# Patient Record
Sex: FEMALE | Race: BLACK OR AFRICAN AMERICAN | NOT HISPANIC OR LATINO | Employment: OTHER | URBAN - METROPOLITAN AREA
[De-identification: names, ages, dates, MRNs, and addresses within clinical notes are randomized per-mention and may not be internally consistent; named-entity substitution may affect disease eponyms.]

---

## 2023-04-17 LAB
BASOPHILS (10*3/UL) IN BLOOD BY AUTOMATED COUNT: 0.03 X10E9/L (ref 0–0.1)
BASOPHILS/100 LEUKOCYTES IN BLOOD BY AUTOMATED COUNT: 0.5 % (ref 0–2)
CHOLESTEROL (MG/DL) IN SER/PLAS: 118 MG/DL (ref 0–199)
CHOLESTEROL IN HDL (MG/DL) IN SER/PLAS: 39.4 MG/DL
CHOLESTEROL/HDL RATIO: 3
EOSINOPHILS (10*3/UL) IN BLOOD BY AUTOMATED COUNT: 0.08 X10E9/L (ref 0–0.4)
EOSINOPHILS/100 LEUKOCYTES IN BLOOD BY AUTOMATED COUNT: 1.3 % (ref 0–6)
ERYTHROCYTE DISTRIBUTION WIDTH (RATIO) BY AUTOMATED COUNT: 15.3 % (ref 11.5–14.5)
ERYTHROCYTE MEAN CORPUSCULAR HEMOGLOBIN CONCENTRATION (G/DL) BY AUTOMATED: 31.4 G/DL (ref 32–36)
ERYTHROCYTE MEAN CORPUSCULAR VOLUME (FL) BY AUTOMATED COUNT: 92 FL (ref 80–100)
ERYTHROCYTES (10*6/UL) IN BLOOD BY AUTOMATED COUNT: 3.26 X10E12/L (ref 4–5.2)
HEMATOCRIT (%) IN BLOOD BY AUTOMATED COUNT: 29.9 % (ref 36–46)
HEMOGLOBIN (G/DL) IN BLOOD: 9.4 G/DL (ref 12–16)
IMMATURE GRANULOCYTES/100 LEUKOCYTES IN BLOOD BY AUTOMATED COUNT: 0.5 % (ref 0–0.9)
LDL: 61 MG/DL (ref 0–99)
LEUKOCYTES (10*3/UL) IN BLOOD BY AUTOMATED COUNT: 6.3 X10E9/L (ref 4.4–11.3)
LYMPHOCYTES (10*3/UL) IN BLOOD BY AUTOMATED COUNT: 1.79 X10E9/L (ref 0.8–3)
LYMPHOCYTES/100 LEUKOCYTES IN BLOOD BY AUTOMATED COUNT: 28.3 % (ref 13–44)
MONOCYTES (10*3/UL) IN BLOOD BY AUTOMATED COUNT: 0.7 X10E9/L (ref 0.05–0.8)
MONOCYTES/100 LEUKOCYTES IN BLOOD BY AUTOMATED COUNT: 11.1 % (ref 2–10)
NATRIURETIC PEPTIDE B (PG/ML) IN SER/PLAS: 104 PG/ML (ref 0–99)
NEUTROPHILS (10*3/UL) IN BLOOD BY AUTOMATED COUNT: 3.7 X10E9/L (ref 1.6–5.5)
NEUTROPHILS/100 LEUKOCYTES IN BLOOD BY AUTOMATED COUNT: 58.3 % (ref 40–80)
PLATELETS (10*3/UL) IN BLOOD AUTOMATED COUNT: 240 X10E9/L (ref 150–450)
THYROTROPIN (MIU/L) IN SER/PLAS BY DETECTION LIMIT <= 0.05 MIU/L: 2.01 MIU/L (ref 0.44–3.98)
TRIGLYCERIDE (MG/DL) IN SER/PLAS: 90 MG/DL (ref 0–149)
VLDL: 18 MG/DL (ref 0–40)

## 2023-04-18 LAB
COBALAMIN (VITAMIN B12) (PG/ML) IN SER/PLAS: 171 PG/ML (ref 211–911)
ESTIMATED AVERAGE GLUCOSE FOR HBA1C: 140 MG/DL
FOLATE (NG/ML) IN SER/PLAS: 8.5 NG/ML
HEMOGLOBIN A1C/HEMOGLOBIN TOTAL IN BLOOD: 6.5 %

## 2023-10-03 PROBLEM — I10 BENIGN ESSENTIAL HYPERTENSION: Status: ACTIVE | Noted: 2023-10-03

## 2023-10-03 PROBLEM — G89.29 CHRONIC ABDOMINAL PAIN: Status: ACTIVE | Noted: 2023-10-03

## 2023-10-03 PROBLEM — J18.9 PNEUMONIA: Status: ACTIVE | Noted: 2023-10-03

## 2023-10-03 PROBLEM — M85.80 OSTEOPENIA: Status: ACTIVE | Noted: 2023-10-03

## 2023-10-03 PROBLEM — K22.70 BARRETT'S ESOPHAGUS: Status: ACTIVE | Noted: 2023-10-03

## 2023-10-03 PROBLEM — R79.82 CRP ELEVATED: Status: ACTIVE | Noted: 2023-10-03

## 2023-10-03 PROBLEM — R19.5 ABNORMAL STOOLS: Status: ACTIVE | Noted: 2023-10-03

## 2023-10-03 PROBLEM — M17.10 OSTEOARTHRITIS, LOCALIZED, KNEE: Status: ACTIVE | Noted: 2023-10-03

## 2023-10-03 PROBLEM — F32.A ANXIETY AND DEPRESSION: Status: ACTIVE | Noted: 2023-10-03

## 2023-10-03 PROBLEM — G30.9: Status: ACTIVE | Noted: 2023-10-03

## 2023-10-03 PROBLEM — H53.459 LOSS OF PERIPHERAL VISUAL FIELD: Status: ACTIVE | Noted: 2023-10-03

## 2023-10-03 PROBLEM — H26.9 CATARACT: Status: ACTIVE | Noted: 2023-10-03

## 2023-10-03 PROBLEM — I50.32 CHRONIC DIASTOLIC HEART FAILURE (MULTI): Status: ACTIVE | Noted: 2023-10-03

## 2023-10-03 PROBLEM — C67.9 MALIGNANT NEOPLASM OF URINARY BLADDER (MULTI): Status: ACTIVE | Noted: 2023-10-03

## 2023-10-03 PROBLEM — F41.9 ANXIETY AND DEPRESSION: Status: ACTIVE | Noted: 2023-10-03

## 2023-10-03 PROBLEM — H47.20 OPTIC ATROPHY, RIGHT EYE: Status: ACTIVE | Noted: 2023-10-03

## 2023-10-03 PROBLEM — R10.9 CHRONIC ABDOMINAL PAIN: Status: ACTIVE | Noted: 2023-10-03

## 2023-10-03 PROBLEM — F02.80: Status: ACTIVE | Noted: 2023-10-03

## 2023-10-03 PROBLEM — H47.019: Status: ACTIVE | Noted: 2023-10-03

## 2023-10-03 PROBLEM — N32.89 BLADDER MASS: Status: ACTIVE | Noted: 2023-10-03

## 2023-10-03 PROBLEM — K57.92 DIVERTICULITIS: Status: ACTIVE | Noted: 2023-10-03

## 2023-10-03 PROBLEM — E11.9 TYPE 2 DIABETES MELLITUS (MULTI): Status: ACTIVE | Noted: 2023-10-03

## 2023-10-03 PROBLEM — E53.8 VITAMIN B12 DEFICIENCY: Status: ACTIVE | Noted: 2023-10-03

## 2023-10-03 PROBLEM — D64.9 ANEMIA: Status: ACTIVE | Noted: 2023-10-03

## 2023-10-03 PROBLEM — H52.7 REFRACTION ERROR: Status: ACTIVE | Noted: 2023-10-03

## 2023-10-03 PROBLEM — K59.09 CHRONIC CONSTIPATION: Status: ACTIVE | Noted: 2023-10-03

## 2023-10-03 PROBLEM — E55.9 VITAMIN D DEFICIENCY: Status: ACTIVE | Noted: 2023-10-03

## 2023-10-03 PROBLEM — R30.0 DYSURIA: Status: ACTIVE | Noted: 2023-10-03

## 2023-10-03 RX ORDER — LISINOPRIL 40 MG/1
40 TABLET ORAL
COMMUNITY
Start: 2015-10-16 | End: 2024-01-12 | Stop reason: SDUPTHER

## 2023-10-03 RX ORDER — CHLORTHALIDONE 25 MG/1
25 TABLET ORAL DAILY
COMMUNITY
End: 2024-01-08 | Stop reason: ALTCHOICE

## 2023-10-03 RX ORDER — CHLORDIAZEPOXIDE AND AMITRIPTYLINE HYDROCHLORIDE 10; 27.98 MG/1; MG/1
1 TABLET, FILM COATED ORAL NIGHTLY
COMMUNITY
Start: 2014-10-07 | End: 2023-12-11

## 2023-10-03 RX ORDER — PANTOPRAZOLE SODIUM 40 MG/1
1 TABLET, DELAYED RELEASE ORAL DAILY
COMMUNITY
End: 2023-12-11 | Stop reason: SDUPTHER

## 2023-10-03 RX ORDER — GLYBURIDE 2.5 MG/1
2.5 TABLET ORAL
COMMUNITY

## 2023-10-03 RX ORDER — OXYCODONE AND ACETAMINOPHEN 5; 325 MG/1; MG/1
1-2 TABLET ORAL EVERY 8 HOURS PRN
COMMUNITY
Start: 2023-09-01 | End: 2023-12-11 | Stop reason: ALTCHOICE

## 2023-10-03 RX ORDER — LACTULOSE 10 G/10G
10 SOLUTION ORAL 3 TIMES DAILY PRN
COMMUNITY

## 2023-10-03 RX ORDER — ONDANSETRON 4 MG/1
TABLET, FILM COATED ORAL
COMMUNITY
Start: 2015-12-07

## 2023-10-03 RX ORDER — FERROUS SULFATE 325(65) MG
325 TABLET ORAL
COMMUNITY
Start: 2023-08-17

## 2023-10-03 RX ORDER — ALBUTEROL SULFATE 0.83 MG/ML
2.5 SOLUTION RESPIRATORY (INHALATION) EVERY 6 HOURS PRN
COMMUNITY
End: 2024-02-20 | Stop reason: SDUPTHER

## 2023-10-03 RX ORDER — DULOXETIN HYDROCHLORIDE 20 MG/1
20 CAPSULE, DELAYED RELEASE ORAL
COMMUNITY
Start: 2023-04-28 | End: 2024-01-12 | Stop reason: SDUPTHER

## 2023-10-03 RX ORDER — MEMANTINE HYDROCHLORIDE 10 MG/1
10 TABLET ORAL
COMMUNITY
Start: 2015-04-09

## 2023-10-03 RX ORDER — BUSPIRONE HYDROCHLORIDE 10 MG/1
10 TABLET ORAL EVERY EVENING
COMMUNITY
End: 2024-01-08 | Stop reason: SDUPTHER

## 2023-10-03 RX ORDER — METFORMIN HYDROCHLORIDE 1000 MG/1
1000 TABLET ORAL
COMMUNITY

## 2023-10-03 RX ORDER — TRAZODONE HYDROCHLORIDE 50 MG/1
50 TABLET ORAL DAILY PRN
COMMUNITY
Start: 2023-09-01 | End: 2024-02-20 | Stop reason: ALTCHOICE

## 2023-10-03 RX ORDER — NALOXONE HYDROCHLORIDE 4 MG/.1ML
SPRAY NASAL
COMMUNITY
Start: 2023-09-01 | End: 2023-12-11 | Stop reason: ALTCHOICE

## 2023-10-03 RX ORDER — METOPROLOL TARTRATE 25 MG/1
TABLET, FILM COATED ORAL
COMMUNITY

## 2023-10-03 RX ORDER — FUROSEMIDE 40 MG/1
40 TABLET ORAL
COMMUNITY
Start: 2014-07-03

## 2023-10-03 RX ORDER — LANOLIN ALCOHOL/MO/W.PET/CERES
CREAM (GRAM) TOPICAL
COMMUNITY
Start: 2015-03-26

## 2023-10-03 RX ORDER — DEXTROMETHORPHAN HYDROBROMIDE, GUAIFENESIN 5; 100 MG/5ML; MG/5ML
650 LIQUID ORAL EVERY 8 HOURS PRN
COMMUNITY

## 2023-10-03 RX ORDER — CARVEDILOL 25 MG/1
25 TABLET ORAL
COMMUNITY
End: 2024-01-08 | Stop reason: SDUPTHER

## 2023-10-03 RX ORDER — DONEPEZIL HYDROCHLORIDE 10 MG/1
10 TABLET, FILM COATED ORAL
COMMUNITY
Start: 2015-03-04 | End: 2024-01-12 | Stop reason: SDUPTHER

## 2023-10-03 RX ORDER — PIOGLITAZONEHYDROCHLORIDE 30 MG/1
30 TABLET ORAL
COMMUNITY
Start: 2015-05-14

## 2023-10-03 RX ORDER — TRAMADOL HYDROCHLORIDE 50 MG/1
50 TABLET ORAL
COMMUNITY
End: 2023-12-11 | Stop reason: ALTCHOICE

## 2023-10-03 RX ORDER — HYDROCHLOROTHIAZIDE 25 MG/1
25 TABLET ORAL
COMMUNITY
Start: 2014-08-19 | End: 2024-01-08 | Stop reason: SDUPTHER

## 2023-10-04 ENCOUNTER — APPOINTMENT (OUTPATIENT)
Dept: GERIATRIC MEDICINE | Facility: CLINIC | Age: 88
End: 2023-10-04
Payer: MEDICARE

## 2023-11-15 ENCOUNTER — OFFICE VISIT (OUTPATIENT)
Dept: GERIATRIC MEDICINE | Facility: CLINIC | Age: 88
End: 2023-11-15
Payer: MEDICARE

## 2023-11-15 DIAGNOSIS — F03.90 MAJOR NEUROCOGNITIVE DISORDER (MULTI): ICD-10-CM

## 2023-11-15 DIAGNOSIS — S72.142S CLOSED DISPLACED INTERTROCHANTERIC FRACTURE OF LEFT FEMUR, SEQUELA: Primary | ICD-10-CM

## 2023-11-15 DIAGNOSIS — D64.9 ANEMIA, UNSPECIFIED TYPE: ICD-10-CM

## 2023-11-15 DIAGNOSIS — L89.302 PRESSURE INJURY OF BUTTOCK, STAGE 2, UNSPECIFIED LATERALITY (MULTI): ICD-10-CM

## 2023-11-15 DIAGNOSIS — E11.69 TYPE 2 DIABETES MELLITUS WITH OTHER SPECIFIED COMPLICATION, WITHOUT LONG-TERM CURRENT USE OF INSULIN (MULTI): ICD-10-CM

## 2023-11-15 DIAGNOSIS — R91.1 LUNG NODULE: ICD-10-CM

## 2023-11-15 PROCEDURE — 1160F RVW MEDS BY RX/DR IN RCRD: CPT | Performed by: NURSE PRACTITIONER

## 2023-11-15 PROCEDURE — 1158F ADVNC CARE PLAN TLK DOCD: CPT | Performed by: NURSE PRACTITIONER

## 2023-11-15 PROCEDURE — 1159F MED LIST DOCD IN RCRD: CPT | Performed by: NURSE PRACTITIONER

## 2023-11-15 PROCEDURE — 3074F SYST BP LT 130 MM HG: CPT | Performed by: NURSE PRACTITIONER

## 2023-11-15 PROCEDURE — 3078F DIAST BP <80 MM HG: CPT | Performed by: NURSE PRACTITIONER

## 2023-11-15 PROCEDURE — 1036F TOBACCO NON-USER: CPT | Performed by: NURSE PRACTITIONER

## 2023-11-15 PROCEDURE — 1126F AMNT PAIN NOTED NONE PRSNT: CPT | Performed by: NURSE PRACTITIONER

## 2023-11-15 PROCEDURE — 99349 HOME/RES VST EST MOD MDM 40: CPT | Performed by: NURSE PRACTITIONER

## 2023-11-15 ASSESSMENT — PAIN SCALES - GENERAL: PAINLEVEL: 0-NO PAIN

## 2023-11-19 VITALS — RESPIRATION RATE: 18 BRPM | DIASTOLIC BLOOD PRESSURE: 60 MMHG | SYSTOLIC BLOOD PRESSURE: 120 MMHG | HEART RATE: 60 BPM

## 2023-11-19 ASSESSMENT — ENCOUNTER SYMPTOMS
LOSS OF SENSATION IN FEET: 0
OCCASIONAL FEELINGS OF UNSTEADINESS: 1
DEPRESSION: 0

## 2023-11-20 ENCOUNTER — HOME HEALTH ADMISSION (OUTPATIENT)
Dept: HOME HEALTH SERVICES | Facility: HOME HEALTH | Age: 88
End: 2023-11-20
Payer: MEDICARE

## 2023-11-20 ENCOUNTER — TELEPHONE (OUTPATIENT)
Dept: GERIATRIC MEDICINE | Facility: CLINIC | Age: 88
End: 2023-11-20
Payer: MEDICARE

## 2023-11-20 PROBLEM — R91.1 LUNG NODULE: Status: ACTIVE | Noted: 2023-11-20

## 2023-11-20 PROBLEM — S72.142A CLOSED DISPLACED INTERTROCHANTERIC FRACTURE OF LEFT FEMUR (MULTI): Status: ACTIVE | Noted: 2023-11-20

## 2023-11-20 PROBLEM — F03.90 MAJOR NEUROCOGNITIVE DISORDER (MULTI): Status: ACTIVE | Noted: 2023-10-03

## 2023-11-20 PROBLEM — L89.302 PRESSURE INJURY OF BUTTOCK, STAGE 2 (MULTI): Status: ACTIVE | Noted: 2023-11-20

## 2023-11-20 NOTE — PROGRESS NOTES
"  Summary Statement: Mrs. 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, HFpEF ( 4/2023 ECHO EF 55%-60%) , CKD Stage III, DM II, chronic abdominal pain, bladder mass -, PUD, , impaired mobility secondary to OA of bilateral knees , , hypomagnesia, nausea, OAB, per medical record : diverticulosis, occasional rectal bleeding,, EDWARD,, vitamin B12 anemia, obesity, UTI ((treated in hospital 5/2023), and cataracts     Urothelial carcinoma in situ ( found on page 32 of [facility] medication record for 4/23/1922  PSH & Procedures :  Total Hysterectomy  Cholecystectomy  R temporal artery biopsy      Reason for homebound status: Leaving her home requires a considerable and taxing amount of effort due to pain in knees secondary OA.         HPI: Mrs. Edwards is being seen today in her home for routine follow up - Her daughter, Li is present for the visit., and her private pay A who is also a LPN for follow up s/p discharge from SNF. Patient is a poor historian due to dementia.       Missed visits in interim due to not able to gain access (did not answer phone and/or door) .  On 10/4/2023 NP went to the home, but office did not notify me that patient's daughter called to report she was sent to ED after falling       Patient sustained a fall at home which resulted in a fracture of the left femur. She underwent surgery.  Post-op complications included acute blood loss , pressure ulcer, and delirium .     At the time of visit, patient is lying in bed.  Sleeping. Arouses easily.  She is a poor historian due to advanced dementia.    \"Per [facility] documentation: \" Post operatively you were recommended to be weight bearing as tolerated and you were started on blood clot prevention " "with aspirin 81 mg twice daily for 28 days. Y for discharge.      Pain to left hip has been controlled with prn tramadol, prn methocarbamol, and tylenol. Patient was ordered oxycodone, however patient became lethargic and confused. Oxycodone was discontinued, blood sugar at that time was low. Blood sugars have now been well controlled in the 100s-200. Urine dipstick was done and negative.   Patient is disoriented at times, will often yell out for staff walking by. Patient is easily redirected. Medications were adjusted.   Patient was followed by wound team for coccyx wound. Left hip incision healing, s/p staples removed.   Labs monitored weekly. Hgb stable at 8.6, cr 1.7.   Vitals have remained stable, no acute issues on discharge. Patient may discharge home with HHC, PT, OT, nurse and aid as needed. \"         ROS: Unable to obtain due to cognitive impairment.  Medications  Reconcile with Patient's ChartMedications  Medication Sig Dispensed Refills Start Date End Date Status   glyBURIDE (DIABETA) 2.5 mg tablet  Take 1 tablet by mouth daily with breakfast. 90 tablet  3 12/30/2022   Active   memantine (NAMENDA) 10 mg tablet  Take 1 tablet by mouth twice daily. 180 tablet  3 02/12/2023   Active   ferrous sulfate 325 mg (65 mg iron) tablet  Take 1 tablet by mouth once daily.   0 08/17/2023   Active   naloxone 4 mg/actuation nasal spray (NARCAN)  Use 1 spray in one nostril as needed for overdose. May repeat every 2 to 3 min in alternating nostrils until medical assistance is available 1 Each  0 09/01/2023   Active   ascorbic acid, vitamin C, (VITAMIN C) 500 mg tablet  Take 1 tablet by mouth once daily.   0 10/10/2023   Active   acetaminophen (TYLENOL) 325 mg tablet  Take 2 tablets by mouth every 6 hours as needed (mild pain).   0 10/09/2023   Active   docusate sodium (COLACE) 100 mg capsule  Take 1 capsule by mouth two times a day as needed for constipation.   0 10/09/2023   Active   ergocalciferol 50,000 unit capsule " (VITAMIN D2, DRISDOL)  Take 1 capsule by mouth two times a week. 24 capsule  0 10/10/2023 01/08/2024 Active   senna (SENOKOT) 8.6 mg tab  Take 2 tablets by mouth two times a day as needed for constipation.   0 10/09/2023   Active   pantoprazole DR (PROTONIX) 40 mg tablet  Take 1 tablet by mouth two times a day before meals at 6 am and 4 pm.   0 10/09/2023   Active   busPIRone (BUSPAR) 10 mg tablet  Take 1 tablet by mouth two times a day. Afternoon and at night 60 tablet  0 11/08/2023 12/08/2023 Active   busPIRone (BUSPAR) 15 mg tablet  Take 1 tablet by mouth once daily. Take in the am 30 tablet  0 11/08/2023 12/08/2023 Active   donepezil (ARICEPT) 5 mg tablet  Take 1 tablet by mouth daily at bedtime. 30 tablet  0 11/08/2023 12/08/2023 Active   lisinopril (ZESTRIL) 20 mg tablet  Take 1 tablet by mouth once daily. 30 tablet  0 11/08/2023 12/08/2023 Active   DULoxetine (CYMBALTA) 60 mg capsule  Take 1 capsule by mouth once daily. 30 capsule  0 11/08/2023 12/08/2023 Active   carvedilol (COREG) 25 mg tablet  Take 1 tablet by mouth two times a day with meals. 60 tablet  0 11/08/2023 12/08/2023 Active   hydrALAZINE (APRESOLINE) 25 mg tablet  Take 3 tablets by mouth three times a day. 270 tablet  0 11/08/2023 12/08/2023 Active   aspirin, enteric coated (ASPIRIN, ENTERIC COATED) 81 mg EC tablet  Take 1 tablet by mouth once daily.   0 11/08/2023   Active       Physical Exam:    Constitutional   General appearance: does not appear normal. obese.~lying in bed asleep, arouses easily, NAD.   Head and Face Examination/ inspection of hair and scalp: Normal.   Eyes   Inspection of eyes: Sclera and conjunctiva were normal.~   Pupil exam: MILADYS. 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   Ears, Nose, Mouth, and Throat   Ears: Normal.~   Oropharynx: Normal with moist mucus membranes, tongue midline, no PND, no erythema or enlargement of tonsils.~   Hearing: Normal. ~   Nasal mucosa, septum, and turbinates: Normal without edema or  erythema.~   Lips, teeth, and gums: Abnormal.  The patient was edentulous.   Neck   Neck Exam: Appearance of the neck was normal. No neck masses observed. No jugular vein distension.   Pulmonary   Respiratory assessment: No respiratory distress, normal respiratory rhythm and effort.~   Auscultation of Lungs: Clear bilateral breath sounds. No rales, rhonchi or wheezes.   Cardiovascular   Auscultation of heart: Apical pulse normal, heart rate and rhythm normal, normal S1 and S2, no murmurs, no gallops and no pericardial rub.~   Pedal pulses: Regular rate. 2+ bilaterally.~   Exam for edema: No peripheral edema.   Chest   Chest: Symmetrical and normal appearance.   Abdomen   Abdominal Exam: No bruits normal bowel sounds, soft, non-tender, no abdominal masses palpated. Bowel sounds were normal. The abdomen was soft and nontender. The abdomen was not firm. No guarding. no masses palpated. The abdomen was normal to percussion. no CVA tenderness~   Liver and Spleen exam: No hepato-splenomegaly.   Genitourinary   Bladder: Normal on palpation.~wearing a diaper-.   Lymphatic   Palpation of lymph nodes in axillae: Normal. ~   Palpation of lymph nodes in neck: No cervical lymphadenopathy.~   Palpation of lymph nodes in other areas: Normal.    Musculoskeletal   Inspection of digits and nails: No clubbing or cyanosis of the fingernails.~   Inspection/palpation of joints: No joint swelling seen.~   Range of Motion: Normal movement of all extremities.~   Muscle strength/tone: Normal.    Skin   Skin inspection: Skin dry, warm and intact. Normal skin color and pigmentation, normal skin turgor and no visible rash . Stage II Pressure ulcer in gluteal fold-healing .The size of a 15.cm x 1 cm. Periwound skin intact. No drainage. Wound bed pink/clean.  Left hip incision healed and well approximated.  Lidoderm Patch intact to left thigh.   Neurologic   Cranial nerves: No tremors.  Poor memory.   Follows simple commands   Psychiatric    Judgment and insight: Intact and appropriate.~   Orientation to  person    Mood and affect: Normal.~   Recent and remote memory: Abnormal.       Labs reviewed 10/8/2023- H&H =8.2/26.1   GFR 28 ml/min/1.73m2 BUN/Creatinine 42/ 1.74      Assessment/Plan  1. Closed displaced intertrochanteric fracture of left femur, sequela  - doing well  -pain controlled  -continue Lidoderm Patch  -Has not required much opioid pain analgesics  -Has not required Methocarbamol  -For blood clot prevention , patient is to continue on  aspirin 81 mg twice daily for 28 days.   -Continue PT/OT [facility]   -continue Duloxetine     2. Pressure injury of buttock, stage 2, unspecified laterality (CMS/[facility])  -small  -[facility] nurses to evaluate an treat  -continue to reposition every 2 hours while awake    3. Major neurocognitive disorder (CMS/[facility])  -advanced  -Total care except for feeding  -Behavioral issues during hospitalization   -Continue Buspirone  and Memantine       4. Anemia, unspecified type  -acute blood loss  -continue Ferrous Sulfate and Ascorbic Acid  -check CBC in 2 weeks     5. Type 2 diabetes mellitus with other specified complication, without long-term current use of insulin (CMS/[facility])  -Patient is on Glyburide  -GFR at the time of discharge from SNF was 28 ml/min/173m2  -Will have nurse call Li and advise her to hold this medication for now  -Last A1c was 6.4 %  -Goal for this patient would be less than 7%  -Will obtain A1c  and RFP in 2 weeks     6. Lung Nodule  -Li is aware  -States it was advised no further workup and she and family agrees           References Up To Date retrieved on 11/20/2023:   eGFR <30 mL/minute/1.73 m2: Use is contraindicated.   GLYCEMIC TARGETS  The glycated hemoglobin (A1C) target that is associated with the best outcome in patients with chronic kidney disease (CKD) has not been established. Clinical practice guidelines recommend an individualized A1C target ranging from <6.5 to <8 percent, depending upon  "patient factors, such as risk of hypoglycemia, presence of cardiovascular disease, and life expectancy [14,15]:  ?Patients not on dialysis (estimated glomerular filtration rate [eGFR] <30 mL/min/1.73 m2) - For most patients with an eGFR <30 mL/min/1.73 m2 who are not on dialysis, we suggest using an A1C target of approximately 7 percent, although the risks and benefits of targeting this goal are uncertain. Data supporting this goal are from studies of patients without CKD and are discussed elsewhere. (See \"Glycemic control and vascular complications in type 2 diabetes mellitus\", section on 'Choosing a glycemic target'.)  Management of hyperglycemia in patients with type 2 diabetes and advanced chronic kidney disease or end-stage kidney disease AUTHORS:  MD Aleks Rowell MD Anthony DeSantis, MD    Additional : In the setting of nondialysis chronic kidney disease stage 4 (eg, eGFR <30 mL/min/1.73 m2), we prefer a short-acting low-dose sulfonylurea (eg, glipizide)       -Stable  -Follow up in 3-4 weeks       "

## 2023-11-20 NOTE — TELEPHONE ENCOUNTER
----- Message from LIZBET Barriga-CNP sent at 11/20/2023  7:33 AM EST -----  Regarding: Hold Glyburide  Hi  Please call patient's daughter , Li.  Due to patient's low kidney function, we should hold the GlyBURIDE.  The lab will contact her in 2 weeks to check blood work  At that time, we will make a decision if we should continue  Kidneys are functioning around 28% .    I will see her again in 3-4 weeks or sooner If needed  Discuss s/s of hyperglycemia ( increased urination, increased thirst, increased appetite, excessive sweating, and difficulty staying awake-she should call us immediately.   Thanks,  DB    Called and spoke with Pt's daughter Li regarding instructions per WILL COLEY to hold the glyburide and watch for s/s of hyper glycemia and she verbalizes understanding.

## 2023-11-20 NOTE — PATIENT INSTRUCTIONS
Dear Mrs. Edwards,  [facility]    Continue to work with PT/OT    -Due to your reduced (low) kidney function we need to hold the Glyburide for now.  -If you notice any signs or symptoms of high blood sugars such as-  ( increased urination, increased thirst, increased appetite, excessive sweating, and difficulty staying awake-she should call us immediately.   I- will have the lab contact you in a few weeks .    -You will need to ask your insurance agency what home care agencies will accept their insurance.  -As we discussed, I am not aware of any agencies accepting [employer] for long term home health aid services,   And your friend who runs a home health agency confirmed this.     I will follow up with you in 3-4 weeks.    Sincerely,  LIZBET Barriga-BC

## 2023-11-30 ENCOUNTER — HOME CARE VISIT (OUTPATIENT)
Dept: HOME HEALTH SERVICES | Facility: HOME HEALTH | Age: 88
End: 2023-11-30

## 2023-11-30 ENCOUNTER — LAB (OUTPATIENT)
Dept: LAB | Facility: LAB | Age: 88
End: 2023-11-30
Payer: MEDICARE

## 2023-11-30 DIAGNOSIS — E11.69 TYPE 2 DIABETES MELLITUS WITH OTHER SPECIFIED COMPLICATION, WITHOUT LONG-TERM CURRENT USE OF INSULIN (MULTI): ICD-10-CM

## 2023-11-30 DIAGNOSIS — D64.9 ANEMIA, UNSPECIFIED TYPE: ICD-10-CM

## 2023-11-30 LAB
ALBUMIN SERPL BCP-MCNC: 2.8 G/DL (ref 3.4–5)
ANION GAP SERPL CALC-SCNC: 14 MMOL/L (ref 10–20)
BASOPHILS # BLD AUTO: 0.03 X10*3/UL (ref 0–0.1)
BASOPHILS NFR BLD AUTO: 0.4 %
BUN SERPL-MCNC: 23 MG/DL (ref 6–23)
CALCIUM SERPL-MCNC: 8.4 MG/DL (ref 8.6–10.3)
CHLORIDE SERPL-SCNC: 105 MMOL/L (ref 98–107)
CO2 SERPL-SCNC: 23 MMOL/L (ref 21–32)
CREAT SERPL-MCNC: 1 MG/DL (ref 0.5–1.05)
EOSINOPHIL # BLD AUTO: 0.11 X10*3/UL (ref 0–0.4)
EOSINOPHIL NFR BLD AUTO: 1.5 %
ERYTHROCYTE [DISTWIDTH] IN BLOOD BY AUTOMATED COUNT: 16.8 % (ref 11.5–14.5)
GFR SERPL CREATININE-BSD FRML MDRD: 55 ML/MIN/1.73M*2
GLUCOSE SERPL-MCNC: 214 MG/DL (ref 74–99)
HCT VFR BLD AUTO: 28.9 % (ref 36–46)
HGB BLD-MCNC: 9 G/DL (ref 12–16)
IMM GRANULOCYTES # BLD AUTO: 0.02 X10*3/UL (ref 0–0.5)
IMM GRANULOCYTES NFR BLD AUTO: 0.3 % (ref 0–0.9)
LYMPHOCYTES # BLD AUTO: 1.8 X10*3/UL (ref 0.8–3)
LYMPHOCYTES NFR BLD AUTO: 25.2 %
MCH RBC QN AUTO: 31.4 PG (ref 26–34)
MCHC RBC AUTO-ENTMCNC: 31.1 G/DL (ref 32–36)
MCV RBC AUTO: 101 FL (ref 80–100)
MONOCYTES # BLD AUTO: 0.78 X10*3/UL (ref 0.05–0.8)
MONOCYTES NFR BLD AUTO: 10.9 %
NEUTROPHILS # BLD AUTO: 4.4 X10*3/UL (ref 1.6–5.5)
NEUTROPHILS NFR BLD AUTO: 61.7 %
NRBC BLD-RTO: 0 /100 WBCS (ref 0–0)
PHOSPHATE SERPL-MCNC: 3.5 MG/DL (ref 2.5–4.9)
PLATELET # BLD AUTO: 206 X10*3/UL (ref 150–450)
POTASSIUM SERPL-SCNC: 4 MMOL/L (ref 3.5–5.3)
RBC # BLD AUTO: 2.87 X10*6/UL (ref 4–5.2)
SODIUM SERPL-SCNC: 138 MMOL/L (ref 136–145)
WBC # BLD AUTO: 7.1 X10*3/UL (ref 4.4–11.3)

## 2023-11-30 PROCEDURE — 80069 RENAL FUNCTION PANEL: CPT

## 2023-11-30 PROCEDURE — 36415 COLL VENOUS BLD VENIPUNCTURE: CPT

## 2023-11-30 PROCEDURE — 83036 HEMOGLOBIN GLYCOSYLATED A1C: CPT

## 2023-11-30 PROCEDURE — 85025 COMPLETE CBC W/AUTO DIFF WBC: CPT

## 2023-12-01 LAB
EST. AVERAGE GLUCOSE BLD GHB EST-MCNC: 105 MG/DL
HBA1C MFR BLD: 5.3 %

## 2023-12-11 ENCOUNTER — OFFICE VISIT (OUTPATIENT)
Dept: GERIATRIC MEDICINE | Facility: CLINIC | Age: 88
End: 2023-12-11
Payer: MEDICARE

## 2023-12-11 DIAGNOSIS — G47.00 INSOMNIA, UNSPECIFIED TYPE: ICD-10-CM

## 2023-12-11 DIAGNOSIS — M25.572 PAIN IN JOINT OF LEFT FOOT: ICD-10-CM

## 2023-12-11 DIAGNOSIS — K21.9 GASTROESOPHAGEAL REFLUX DISEASE WITHOUT ESOPHAGITIS: ICD-10-CM

## 2023-12-11 DIAGNOSIS — I10 HYPERTENSION, UNSPECIFIED TYPE: Primary | ICD-10-CM

## 2023-12-11 DIAGNOSIS — S72.142S CLOSED DISPLACED INTERTROCHANTERIC FRACTURE OF LEFT FEMUR, SEQUELA: ICD-10-CM

## 2023-12-11 PROCEDURE — 3077F SYST BP >= 140 MM HG: CPT | Performed by: NURSE PRACTITIONER

## 2023-12-11 PROCEDURE — 99349 HOME/RES VST EST MOD MDM 40: CPT | Performed by: NURSE PRACTITIONER

## 2023-12-11 PROCEDURE — 1126F AMNT PAIN NOTED NONE PRSNT: CPT | Performed by: NURSE PRACTITIONER

## 2023-12-11 PROCEDURE — 3078F DIAST BP <80 MM HG: CPT | Performed by: NURSE PRACTITIONER

## 2023-12-11 PROCEDURE — 1036F TOBACCO NON-USER: CPT | Performed by: NURSE PRACTITIONER

## 2023-12-11 PROCEDURE — 1159F MED LIST DOCD IN RCRD: CPT | Performed by: NURSE PRACTITIONER

## 2023-12-11 PROCEDURE — 1160F RVW MEDS BY RX/DR IN RCRD: CPT | Performed by: NURSE PRACTITIONER

## 2023-12-11 PROCEDURE — 1158F ADVNC CARE PLAN TLK DOCD: CPT | Performed by: NURSE PRACTITIONER

## 2023-12-11 RX ORDER — BUSPIRONE HYDROCHLORIDE 15 MG/1
15 TABLET ORAL DAILY
COMMUNITY
End: 2024-01-08 | Stop reason: SDUPTHER

## 2023-12-11 RX ORDER — SENNOSIDES 25 MG/1
TABLET, FILM COATED ORAL
Qty: 45 G | Refills: 3 | Status: SHIPPED | OUTPATIENT
Start: 2023-12-11

## 2023-12-11 RX ORDER — TALC
POWDER (GRAM) TOPICAL
Qty: 90 TABLET | Refills: 3 | Status: SHIPPED | OUTPATIENT
Start: 2023-12-11

## 2023-12-11 RX ORDER — PANTOPRAZOLE SODIUM 40 MG/1
40 TABLET, DELAYED RELEASE ORAL DAILY
Qty: 90 TABLET | Refills: 3 | Status: SHIPPED | OUTPATIENT
Start: 2023-12-11 | End: 2024-01-08

## 2023-12-11 ASSESSMENT — PAIN SCALES - GENERAL: PAINLEVEL: 0-NO PAIN

## 2023-12-17 VITALS — DIASTOLIC BLOOD PRESSURE: 62 MMHG | RESPIRATION RATE: 20 BRPM | SYSTOLIC BLOOD PRESSURE: 142 MMHG | HEART RATE: 77 BPM

## 2023-12-17 PROBLEM — G47.00 INSOMNIA: Status: ACTIVE | Noted: 2023-12-17

## 2023-12-17 PROBLEM — M25.572 PAIN IN JOINT OF LEFT FOOT: Status: ACTIVE | Noted: 2023-12-17

## 2023-12-17 PROBLEM — K21.9 GASTROESOPHAGEAL REFLUX DISEASE WITHOUT ESOPHAGITIS: Status: ACTIVE | Noted: 2023-12-17

## 2023-12-18 DIAGNOSIS — F32.A ANXIETY AND DEPRESSION: ICD-10-CM

## 2023-12-18 DIAGNOSIS — S72.142S CLOSED DISPLACED INTERTROCHANTERIC FRACTURE OF LEFT FEMUR, SEQUELA: Primary | ICD-10-CM

## 2023-12-18 DIAGNOSIS — M17.12 LOCALIZED OSTEOARTHRITIS OF LEFT KNEE: ICD-10-CM

## 2023-12-18 DIAGNOSIS — F41.9 ANXIETY AND DEPRESSION: ICD-10-CM

## 2023-12-18 RX ORDER — METHOCARBAMOL 500 MG/1
500 TABLET, FILM COATED ORAL
COMMUNITY
Start: 2023-11-09 | End: 2024-01-08 | Stop reason: ALTCHOICE

## 2023-12-18 RX ORDER — METHOCARBAMOL 500 MG/1
TABLET, FILM COATED ORAL
Qty: 60 TABLET | Refills: 3 | Status: SHIPPED | OUTPATIENT
Start: 2023-12-18 | End: 2024-01-08 | Stop reason: ALTCHOICE

## 2023-12-18 RX ORDER — TRAMADOL HYDROCHLORIDE 50 MG/1
50 TABLET ORAL 2 TIMES DAILY PRN
Qty: 60 TABLET | Refills: 0 | Status: SHIPPED | OUTPATIENT
Start: 2023-12-18 | End: 2024-01-17

## 2023-12-18 RX ORDER — BUSPIRONE HYDROCHLORIDE 15 MG/1
15 TABLET ORAL DAILY
Qty: 30 TABLET | Refills: 0 | Status: CANCELLED | OUTPATIENT
Start: 2023-12-18

## 2023-12-18 RX ORDER — NALOXONE HYDROCHLORIDE 4 MG/.1ML
4 SPRAY NASAL AS NEEDED
Qty: 1 EACH | Refills: 3 | Status: SHIPPED | OUTPATIENT
Start: 2023-12-18 | End: 2024-03-13 | Stop reason: SDUPTHER

## 2023-12-18 NOTE — PATIENT INSTRUCTIONS
Dear Mrs. Edwards,     It was a pleasure seeing you today.     I have ordered Melatonin for sleep as needed   Refilled the Pantoprazole for acid reflux   Ordered Lidocaine Cream to be applied as directed to you left foot and ankle.    I will follow up with you in 2 months or sooner if needed.  Sincerely,    Karolina Martínez, MSN, APRN-BC

## 2023-12-18 NOTE — TELEPHONE ENCOUNTER
Paul Jefferson is requesting to have a script for Methocarbamol 500 mg one every 8 hrs prn up to five days sent to the pharmacy and tramadol 25 mg tabs prn.

## 2023-12-18 NOTE — PROGRESS NOTES
Reason for visit:  Follow up for HTN and management of chronic active illnesses.           Summary Statement: Mrs. 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, HFpEF ( 4/2023 ECHO EF 55%-60%) , CKD Stage III, DM II, chronic abdominal pain, bladder mass -, PUD, , impaired mobility secondary to OA of bilateral knees , , hypomagnesia, nausea, OAB, per medical record : diverticulosis, occasional rectal bleeding,, EDWARD,, vitamin B12 anemia, obesity, UTI ((treated in hospital 5/2023), and cataracts     Urothelial carcinoma in situ ( found on page 32 of [facility] medication record for 4/23/1922  PSH & Procedures :  Total Hysterectomy  Cholecystectomy  R temporal artery biopsy      Reason for homebound status: Leaving her home requires a considerable and taxing amount of effort due to pain in knees secondary OA.         HPI: Mrs. Edwards is being seen today in her home for routine follow up - Her daughter, Li is present for the visit., and her private pay A who is also a LPN is present. Patient is a poor historian due to dementia.        At the time of visit, patient is sitting upright in her wheelchair at the dining room table eating.  She looks good.  States she feels fine.  Caregiver states patient is not ambulating. She is working with PT . States patient complains of pain in her left foot and therapist suggested Gabapentin.  NP pointed out that patient is currently on Duloxeint 60 mg daily.    States she only complains of pain to the top of foot.     Also, They giving the Buspirone 10 mg every pm  and prn if needed (one dose) and the 15 mg qam.  States this has been working and she does not scream or yell out.     Need refill on Pantoprazole    Appetite is  good  No fever or chills  Moving bowels well  No chest pain or sob  No cough, cold or flu-like symptoms  No falls  Did follow up with Ortho in interim and was given a good report  No dizziness.   No blood in stool or urine     States patient does not sleep well at night, and does not do an excessive amount of daytime sleeping.  They try to keep patient engaged during the day.    Looking for additional assistance due to daughter has issues with her health.  Patient does not have Medicaid so she does not qualify for a long term HHA.  Suggested applying to [employer]     Physical Exam  Constitutional   General appearance: sitting at table. NAD.   Head and Face Examination/ inspection of hair and scalp: Normal.   Eyes   Inspection of eyes: Sclera and conjunctiva were normal.~   Pupil exam: MILADYS. 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   Ears, Nose, Mouth, and Throat   Ears: Normal.~   Oropharynx: Normal with moist mucus membranes, tongue midline, no PND, no erythema or enlargement of tonsils.~   Hearing: Normal. ~   Nasal mucosa, septum, and turbinates: Normal without edema or erythema.~   Lips, teeth, and gums: Abnormal.  The patient was edentulous.   Neck   Neck Exam: Appearance of the neck was normal. No neck masses observed. No jugular vein distension.   Pulmonary   Respiratory assessment: No respiratory distress, normal respiratory rhythm and effort.~   Auscultation of Lungs: Clear bilateral breath sounds. No rales, rhonchi or wheezes.   Cardiovascular   Auscultation of heart: Apical pulse normal, heart rate and rhythm normal, normal S1 and S2, no murmurs, no gallops and no pericardial rub.~   Pedal pulses: Regular rate. 2+ bilaterally.~   Exam for edema: No peripheral edema.   Chest   Chest: Symmetrical and normal appearance.   Abdomen   Abdominal Exam: No bruits normal bowel sounds, soft, non-tender, no abdominal masses palpated. Bowel sounds were normal. The abdomen was soft and nontender. The abdomen was  not firm. No guarding. no masses palpated. The abdomen was normal to percussion. no CVA tenderness~   Liver and Spleen exam: No hepato-splenomegaly.   Genitourinary   Bladder: Normal on palpation.~wearing a diaper-.   Lymphatic   Palpation of lymph nodes in axillae: Normal. ~   Palpation of lymph nodes in neck: No cervical lymphadenopathy.~   Palpation of lymph nodes in other areas: Normal.    Musculoskeletal   Inspection of digits and nails: No clubbing or cyanosis of the fingernails.~   Inspection/palpation of joints: No joint swelling seen.~   Range of Motion: Normal movement of all extremities.~   Muscle strength/tone: Normal.    Skin   Skin inspection: Skin dry, warm and intact. Normal skin color and pigmentation, normal skin turgor and no visible rash .   Neurologic   Cranial nerves: No tremors.  Poor memory.   Follows simple commands   Psychiatric   Judgment and insight: Intact and appropriate.~   Orientation to  person    Mood and affect: Normal.~   Recent and remote memory: Abnormal.          Chemistry    Lab Results   Component Value Date/Time     11/30/2023 1736    K 4.0 11/30/2023 1736     11/30/2023 1736    CO2 23 11/30/2023 1736    BUN 23 11/30/2023 1736    CREATININE 1.00 11/30/2023 1736    Lab Results   Component Value Date/Time    CALCIUM 8.4 (L) 11/30/2023 1736    ALKPHOS 70 08/01/2018 0114    AST 43 (H) 08/01/2018 0114    ALT 24 08/01/2018 0114    BILITOT 0.4 08/01/2018 0114        Lab Results   Component Value Date    HGBA1C 5.3 11/30/2023       1.  Hypertension, unspecified type  - BP stable  -continue to monitor        2. Pain in joint of left foot    - Start lidocaine (Xylocaine) 5 % cream cream; Apply a thin film to left foot three times a day as needed (Use glove when applying)  Dispense: 45 g; Refill: 3  -Continue Duloxetine     3. Gastroesophageal reflux disease without esophagitis    - pantoprazole (ProtoNix) 40 mg EC tablet; Take 1 tablet (40 mg) by mouth once daily. TAKE PER  DIRECTED  Dispense: 90 tablet; Refill: 3    4.Insomnia, unspecified type  -Start - melatonin 3 mg tablet; Give one tablet nightly as needed for difficulty sleeping  Dispense: 90 tablet; Refill: 3    5. Closed displaced intertrochanteric fracture of left femur, sequela  -doing well  -not ambulating  -continue to work with PT in the home     Stable  Routine follow up in 2 months

## 2023-12-19 NOTE — PROGRESS NOTES
Daughter called to request refills on Methocarbamol and Tramadol.    It was not made clear at the last visit that she was requiring Tramadol .  Patient needs a controlled substance agreement.  Will review and have daughter sign at next visit.  S/P surgery for fracture of left femur- 2 months ago, but previously had issues with chronic pain due to OA of the knee.    Oarrs report received and reviewed.  Last filled 11/9/2023 qty of 7 for 4 days.    Will prescribe one tablet twice a day prn moderate to severe pain- 60 tablets- no refills     Also requested Methocarbamol 500 mg q 8 hours prn- this was not revealed at the last visit that she was requiring this much.  DB

## 2024-01-08 DIAGNOSIS — I10 HYPERTENSION, UNSPECIFIED TYPE: ICD-10-CM

## 2024-01-08 DIAGNOSIS — K21.9 GASTROESOPHAGEAL REFLUX DISEASE WITHOUT ESOPHAGITIS: ICD-10-CM

## 2024-01-08 DIAGNOSIS — F33.9 EPISODE OF RECURRENT MAJOR DEPRESSIVE DISORDER, UNSPECIFIED DEPRESSION EPISODE SEVERITY (CMS-HCC): Primary | ICD-10-CM

## 2024-01-08 RX ORDER — CARVEDILOL 25 MG/1
25 TABLET ORAL
Qty: 180 TABLET | Refills: 0 | OUTPATIENT
Start: 2024-01-08

## 2024-01-08 RX ORDER — BUSPIRONE HYDROCHLORIDE 10 MG/1
10 TABLET ORAL 2 TIMES DAILY
Qty: 180 TABLET | Refills: 0 | OUTPATIENT
Start: 2024-01-08

## 2024-01-08 RX ORDER — CARVEDILOL 25 MG/1
25 TABLET ORAL
Qty: 180 TABLET | Refills: 3 | Status: SHIPPED | OUTPATIENT
Start: 2024-01-08 | End: 2025-01-07

## 2024-01-08 RX ORDER — BUSPIRONE HYDROCHLORIDE 10 MG/1
TABLET ORAL
Qty: 115 TABLET | Refills: 3 | Status: SHIPPED | OUTPATIENT
Start: 2024-01-08

## 2024-01-08 RX ORDER — BUSPIRONE HYDROCHLORIDE 15 MG/1
15 TABLET ORAL DAILY
Qty: 90 TABLET | Refills: 3 | Status: SHIPPED | OUTPATIENT
Start: 2024-01-08 | End: 2025-01-07

## 2024-01-08 RX ORDER — BUSPIRONE HYDROCHLORIDE 15 MG/1
15 TABLET ORAL DAILY
Qty: 90 TABLET | Refills: 0 | OUTPATIENT
Start: 2024-01-08

## 2024-01-08 RX ORDER — HYDROCHLOROTHIAZIDE 25 MG/1
25 TABLET ORAL DAILY
Qty: 90 TABLET | Refills: 3 | Status: SHIPPED | OUTPATIENT
Start: 2024-01-08 | End: 2025-01-07

## 2024-01-08 RX ORDER — OMEPRAZOLE 40 MG/1
40 CAPSULE, DELAYED RELEASE ORAL
Qty: 90 CAPSULE | Refills: 3 | Status: SHIPPED | OUTPATIENT
Start: 2024-01-08 | End: 2024-02-20

## 2024-01-08 RX ORDER — HYDROCHLOROTHIAZIDE 25 MG/1
25 TABLET ORAL
OUTPATIENT
Start: 2024-01-08

## 2024-01-08 RX ORDER — DULOXETIN HYDROCHLORIDE 20 MG/1
20 CAPSULE, DELAYED RELEASE ORAL
Qty: 90 CAPSULE | Refills: 0 | OUTPATIENT
Start: 2024-01-08

## 2024-01-08 RX ORDER — LISINOPRIL 40 MG/1
40 TABLET ORAL DAILY
Qty: 90 TABLET | Refills: 0 | OUTPATIENT
Start: 2024-01-08

## 2024-01-10 RX ORDER — DULOXETIN HYDROCHLORIDE 20 MG/1
20 CAPSULE, DELAYED RELEASE ORAL
Qty: 90 CAPSULE | Refills: 2 | OUTPATIENT
Start: 2024-01-10

## 2024-01-10 RX ORDER — DONEPEZIL HYDROCHLORIDE 10 MG/1
10 TABLET, FILM COATED ORAL DAILY
Qty: 90 TABLET | Refills: 2 | OUTPATIENT
Start: 2024-01-10

## 2024-01-10 RX ORDER — LISINOPRIL 40 MG/1
40 TABLET ORAL
Qty: 90 TABLET | Refills: 0 | OUTPATIENT
Start: 2024-01-10

## 2024-01-12 DIAGNOSIS — F03.90 MAJOR NEUROCOGNITIVE DISORDER (MULTI): ICD-10-CM

## 2024-01-12 DIAGNOSIS — I10 HYPERTENSION, UNSPECIFIED TYPE: ICD-10-CM

## 2024-01-12 DIAGNOSIS — F41.9 ANXIETY AND DEPRESSION: Primary | ICD-10-CM

## 2024-01-12 DIAGNOSIS — F32.A ANXIETY AND DEPRESSION: Primary | ICD-10-CM

## 2024-01-12 RX ORDER — DULOXETIN HYDROCHLORIDE 20 MG/1
20 CAPSULE, DELAYED RELEASE ORAL
Qty: 90 CAPSULE | Refills: 3 | Status: SHIPPED | OUTPATIENT
Start: 2024-01-12 | End: 2024-01-17 | Stop reason: SDUPTHER

## 2024-01-12 RX ORDER — DONEPEZIL HYDROCHLORIDE 10 MG/1
10 TABLET, FILM COATED ORAL NIGHTLY
Qty: 90 TABLET | Refills: 3 | Status: SHIPPED | OUTPATIENT
Start: 2024-01-12 | End: 2024-01-17 | Stop reason: SDUPTHER

## 2024-01-12 RX ORDER — LISINOPRIL 40 MG/1
40 TABLET ORAL DAILY
Qty: 90 TABLET | Refills: 3 | Status: SHIPPED | OUTPATIENT
Start: 2024-01-12 | End: 2024-01-20 | Stop reason: DRUGHIGH

## 2024-01-17 ENCOUNTER — OFFICE VISIT (OUTPATIENT)
Dept: GERIATRIC MEDICINE | Facility: CLINIC | Age: 89
End: 2024-01-17
Payer: MEDICARE

## 2024-01-17 DIAGNOSIS — M17.10 OSTEOARTHRITIS, LOCALIZED, KNEE: ICD-10-CM

## 2024-01-17 DIAGNOSIS — K59.09 CHRONIC CONSTIPATION: ICD-10-CM

## 2024-01-17 DIAGNOSIS — E11.69 TYPE 2 DIABETES MELLITUS WITH OTHER SPECIFIED COMPLICATION, WITHOUT LONG-TERM CURRENT USE OF INSULIN (MULTI): ICD-10-CM

## 2024-01-17 DIAGNOSIS — F03.90 MAJOR NEUROCOGNITIVE DISORDER (MULTI): ICD-10-CM

## 2024-01-17 DIAGNOSIS — E53.8 VITAMIN B12 DEFICIENCY: ICD-10-CM

## 2024-01-17 DIAGNOSIS — M25.572 PAIN IN JOINT OF LEFT FOOT: ICD-10-CM

## 2024-01-17 DIAGNOSIS — F41.9 ANXIETY AND DEPRESSION: ICD-10-CM

## 2024-01-17 DIAGNOSIS — G89.29 OTHER CHRONIC PAIN: ICD-10-CM

## 2024-01-17 DIAGNOSIS — D75.89 MACROCYTOSIS: ICD-10-CM

## 2024-01-17 DIAGNOSIS — F32.A ANXIETY AND DEPRESSION: ICD-10-CM

## 2024-01-17 DIAGNOSIS — D64.9 ANEMIA, UNSPECIFIED TYPE: ICD-10-CM

## 2024-01-17 DIAGNOSIS — M85.80 OSTEOPENIA, UNSPECIFIED LOCATION: ICD-10-CM

## 2024-01-17 DIAGNOSIS — I10 BENIGN ESSENTIAL HTN: Primary | ICD-10-CM

## 2024-01-17 DIAGNOSIS — M79.605 PAIN IN LEFT LEG: ICD-10-CM

## 2024-01-17 DIAGNOSIS — S72.142S CLOSED DISPLACED INTERTROCHANTERIC FRACTURE OF LEFT FEMUR, SEQUELA: ICD-10-CM

## 2024-01-17 DIAGNOSIS — M77.32 HEEL SPUR, LEFT: ICD-10-CM

## 2024-01-17 PROCEDURE — 3078F DIAST BP <80 MM HG: CPT | Performed by: NURSE PRACTITIONER

## 2024-01-17 PROCEDURE — 99349 HOME/RES VST EST MOD MDM 40: CPT | Performed by: NURSE PRACTITIONER

## 2024-01-17 PROCEDURE — 1160F RVW MEDS BY RX/DR IN RCRD: CPT | Performed by: NURSE PRACTITIONER

## 2024-01-17 PROCEDURE — 1036F TOBACCO NON-USER: CPT | Performed by: NURSE PRACTITIONER

## 2024-01-17 PROCEDURE — 1126F AMNT PAIN NOTED NONE PRSNT: CPT | Performed by: NURSE PRACTITIONER

## 2024-01-17 PROCEDURE — 1158F ADVNC CARE PLAN TLK DOCD: CPT | Performed by: NURSE PRACTITIONER

## 2024-01-17 PROCEDURE — 1159F MED LIST DOCD IN RCRD: CPT | Performed by: NURSE PRACTITIONER

## 2024-01-17 PROCEDURE — 3074F SYST BP LT 130 MM HG: CPT | Performed by: NURSE PRACTITIONER

## 2024-01-17 RX ORDER — DONEPEZIL HYDROCHLORIDE 10 MG/1
10 TABLET, FILM COATED ORAL NIGHTLY
Qty: 90 TABLET | Refills: 3 | Status: SHIPPED | OUTPATIENT
Start: 2024-01-17

## 2024-01-17 RX ORDER — DULOXETIN HYDROCHLORIDE 20 MG/1
20 CAPSULE, DELAYED RELEASE ORAL
Qty: 90 CAPSULE | Refills: 3 | Status: SHIPPED | OUTPATIENT
Start: 2024-01-17 | End: 2024-01-20 | Stop reason: DRUGHIGH

## 2024-01-17 ASSESSMENT — PAIN SCALES - GENERAL: PAINLEVEL: 0-NO PAIN

## 2024-01-20 VITALS — SYSTOLIC BLOOD PRESSURE: 120 MMHG | HEART RATE: 78 BPM | DIASTOLIC BLOOD PRESSURE: 60 MMHG

## 2024-01-20 PROBLEM — M79.605 PAIN IN LEFT LEG: Status: ACTIVE | Noted: 2024-01-20

## 2024-01-20 PROBLEM — K27.9 PUD (PEPTIC ULCER DISEASE): Status: ACTIVE | Noted: 2024-01-20

## 2024-01-20 PROBLEM — M77.32 HEEL SPUR, LEFT: Status: ACTIVE | Noted: 2024-01-20

## 2024-01-20 PROBLEM — D75.89 MACROCYTOSIS: Status: ACTIVE | Noted: 2024-01-20

## 2024-01-20 RX ORDER — LISINOPRIL 20 MG/1
20 TABLET ORAL DAILY
Qty: 90 TABLET | Refills: 3 | Status: SHIPPED | OUTPATIENT
Start: 2024-01-20 | End: 2025-01-19

## 2024-01-20 RX ORDER — DULOXETIN HYDROCHLORIDE 60 MG/1
60 CAPSULE, DELAYED RELEASE ORAL DAILY
Qty: 90 CAPSULE | Refills: 3 | Status: SHIPPED | OUTPATIENT
Start: 2024-01-20 | End: 2025-01-19

## 2024-01-20 RX ORDER — LIDOCAINE 50 MG/G
OINTMENT TOPICAL
Qty: 180 G | Refills: 3 | Status: SHIPPED | OUTPATIENT
Start: 2024-01-20

## 2024-01-20 NOTE — PATIENT INSTRUCTIONS
Dear  Jami Jerry,    It was a pleasure seeing you today.  I will have our office reach out to Li about scheduling a Telehealth Visit with Dr. Gaona  For an expert evaluation .      Sincerely,    Karolina Martínez, MSN, APRN-BC

## 2024-01-20 NOTE — PROGRESS NOTES
"      Some elements may have been copied from prior note(s). The elements have been updated and reflect current evaluation, examination and decision making from today.           Reason for visit:  Follow up on OA/Pain and management of chronic active illnesses.         Summary Statement: : Mrs. 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, HFpEF ( 4/2023 ECHO EF 55%-60%) , CKD Stage III, DM II, chronic abdominal pain, bladder mass -, PUD, , impaired mobility secondary to OA of bilateral knees , , hypomagnesia, nausea, OAB, per medical record : diverticulosis, occasional rectal bleeding,, EDWARD,, vitamin B12 anemia, obesity, UTI ((treated in hospital 5/2023), and cataracts     Urothelial carcinoma in situ ( found on page 32 of [facility] medical record for 4/23/1922  -Per medical record 2023- family declining any further workup   PSH & Procedures :  Total Hysterectomy  Cholecystectomy  R temporal artery biopsy      Reason for homebound status: Leaving her home requires a considerable and taxing amount of effort due to pain in knees secondary OA.       HPI:  Mrs. Edwards is being seen today in her home in the presence of her daughter, Li, and her private pay HHA who is also a LPN.     Patient is a poor historian due to advanced dementia.     At the time of visit, patient is sitting upright at the dining room table eating breakfast.   She has returned to her intermittent episodes of screaming out, \"Oh!!\".  However, when asked she denies any pain or that anything is wrong.     Caregiver states that patient was discharged from PT last week,  however, she was beginning to make some progress. Of great concern is that since surgery for an intertrochanteric fracture of " left femur, closed (s/p repair on 10/5/2023) , -patient has stopped ambulating and is wheelchair bound.   -Uses transfer board     -States she does complain of pain in the left leg and foot (See CT SCAN results- 10/9/2023))   -States Tramadol does help-states patient takes one tablet tid and takes Tylenol   -Also takes Duloxetine  -However, did not start topical pain creat to left foot/heel as recommended at the last visit     -States appetite is very good and drinks fluids  -Constipation is being relieved on Miralax 2-3 x's per week  -Skin intact  -No cough, cold or flu-like symptoms  -Sleeps well  -No sick contacts  -Wears Diapers (pays out of pocket)  -Li did not want to pursue with applying for Passport   Li states she needs to seek guardianship and patient needs to have an expert evaluation done.  Patient lives with Li.  She has a sister, but states she will no have any problems with her seeking guardianship. It was something that was just never done and now her dementia has progressed.     Patient is total care except for ability to feed self  Li has decline pursuing Passport assistance in the past- and has a private pay HHA/LPN which she acknowledges is expensive, but states it is worth it because she needs the help.     Here is an excerpt of documentation by BARBARA at [facility] during a telephone visit: 5/16/2023:     Miscellaneous Notes - documented in this encounter  Telephone Encounter - BARBARA Monsalve - 05/16/2023 5:27 PM EDT  Formatting of this note might be different from the original.  MSW NOTE;  SERAFIN spoke to pts dtr re: community resources/respite. Pt lives with dtr (Il) pcg. Dtr is not interested in Passport due to medicaid estate recovery. SW discussed private duty and/or placement. Dtr would like to keep patient in home for as long as safely possible. Dtr discussed feeling physically and mentally tired. Dtr has appt with psychiatrist at the end of the month. SW strongly encouraged  dtr to maintain her own physical and emotional health. Provided active listening and emotional support. Other dtr (Keshia) provides limited assistance. Dtr does have a private HHA that she has interviewed and will be hiring one day a week. Dtr discussed how pt is unable to eat without throwing up and is calling out most of the day. SERAFIN strongly encouraged calling EMS for a medical work up. Dtr uncertain if pt is experiencing sun downers or experiencing actual pain. SERAFIN advised dtr to call EMS either way in order to determine pts currently medical status. Uncertain if dtr will follow up throught however, team/physician updated. Renae DUMONT  Follow up needed: None, if needed.   Electronically signed by BARBARA Monsalve at 05/16/2023 5:27 PM EDT     4/26/2023 -See CT of Brain Results (Lab/Diagnostic Imaging Section )     -Interval History: Recent illness, hospitalizations or surgeries since last visit: 4/25- 5/4/2023~and~Patient has had x 1 previous hospitalizations.   Current Diet: Regular.   Appetite: Very good  Food Consistency: Regular.   Liquids Consistency: thin.   Gait/Mobility: not ambulating- OOB to [facility] with assist x one person.   Bathing: dependent.   Dressing: dependent.   Walking: dependent.   Toileting: dependent.   Feeding: performs independently.   Personal Hygiene: dependent.   Bowels: continent.   Bladder: occasional accident.   Managing Finances: dependent,~Li.   Shopping: dependent.   Managing Medications: dependent.   Housework / Basic Home Maintenance: dependent.   Laundry: dependent.   Preparing Meals: dependent.    Falls Risk Screening:. DARIA has not fallen in the last 6 months.   Home safety risk factors: none.   Patient's DNR Status: Full Code.       -Controlled Substance Monitoring Medication #1 Medication Name: Tramadol,~Pill Count: Yes  Recent Lab Results:  Ordered  Signs of Misuse: no.         Physical Exam:    Physical Exam  Constitutional   General appearance: sitting upright  in manual wheelchair  at table. NAD.   Head and Face Examination/ inspection of hair and scalp: Normal.   Eyes   Inspection of eyes: Sclera and conjunctiva were normal.~   Pupil exam: PERRLA. Extraocular movements were intact. 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   Ears, Nose, Mouth, and Throat   Ears: Normal.~   Oropharynx: Normal with moist mucus membranes, tongue midline, no PND, no erythema or enlargement of tonsils.~   Hearing: Normal. ~   Nasal mucosa, septum, and turbinates: Normal without edema or erythema.~   Lips, teeth, and gums: Abnormal.  The patient was edentulous.   Neck   Neck Exam: Appearance of the neck was normal. No neck masses observed. No jugular vein distension.   Pulmonary   Respiratory assessment: No respiratory distress, normal respiratory rhythm and effort.~   Auscultation of Lungs: Clear bilateral breath sounds. No rales, rhonchi or wheezes.   Cardiovascular   Auscultation of heart: Apical pulse normal, heart rate and rhythm normal, normal S1 and S2, no murmurs, no gallops and no pericardial rub.~   Pedal pulses: Regular rate. 2+ bilaterally.~   Exam for edema: No peripheral edema.   Chest   Chest: Symmetrical and normal appearance.   Abdomen   Abdominal Exam: No bruits normal bowel sounds, soft, non-tender, no abdominal masses palpated. Bowel sounds were normal. The abdomen was soft and nontender. The abdomen was not firm. No guarding. no masses palpated. The abdomen was normal to percussion. no CVA tenderness~   Liver and Spleen exam: No hepato-splenomegaly.   Genitourinary   Bladder: Normal on palpation.~wearing a diaper-.   Lymphatic   Palpation of lymph nodes in axillae: Normal. ~   Palpation of lymph nodes in neck: No cervical lymphadenopathy.~   Palpation of lymph nodes in other areas: Normal.    Musculoskeletal   Inspection of digits and nails: No clubbing or cyanosis of the fingernails.~   Inspection/palpation of joints: No joint swelling seen.~   Range of Motion: Normal movement of all  extremities.~   Muscle strength/tone: Normal.    Skin   Skin inspection: Skin dry, warm and intact. Normal skin color and pigmentation, normal skin turgor and no visible rash .   Neurologic   Cranial nerves: No tremors.  Poor memory.   Follows simple commands   Otherwise intact   Psychiatric   Judgment and insight: Intact and appropriate.~   Orientation to  person  -did not know year, or name of President   Mood and affect: Normal.~   Recent and remote memory: Abnormal.         Labs/Diagnostic Imaging    [facility]  Outside Information  Results  CT BRAIN WO IVCON (Order [id number])      suggestion  Information displayed in this report may not trend or trigger automated decision support.     CT BRAIN WO IVCON  Order: [id number]  Impression    IMPRESSION: Generalized atrophy and sequela of small vessel ischemic  disease.  No acute intracranial process is identified.        Transcribed Using Voice Recognition  Transcribe Date/Time: Apr 26 2023 12:04P    Dictated by: HORTENSIA CLEANING MD    This examination was interpreted and the report reviewed and  electronically signed by:  HORTENSIA CLEANING MD on Apr 26 2023 12:07PM  EST  Narrative    * * *Final Report* * *    DATE OF EXAM: Apr 26 2023 11:51AM      [facility] [id number]  CT BRAIN WO IVCON  / ACCESSION #  [id number]    PROCEDURE REASON: Mental status change, unknown cause        * * * * Physician Interpretation * * * *    RESULT: EXAMINATION: CT BRAIN WO IVCON    CLINICAL HISTORY: Altered mental status    TECHNIQUE:  Serial axial images without IV contrast were obtained from  the vertex to the foramen magnum.  MQ:  [id number]    CT Radiation dose: Integrated Dose-Length Product (DLP) for this visit =    726  mGy*cm  CT Dose Reduction Employed: No dose reduction techniques were required    COMPARISON: 04/26/2022      RESULT: Please note that this examination is somewhat limited secondary  to patient positioning.    There is generalized atrophy.  Prominence of the ventricular  system  secondary to atrophy.  Patchy and confluent hypodensity is seen within  the subcortical and periventricular white matter, likely the sequela of  small vessel ischemic disease.  Atherosclerotic calcifications are seen  within the carotid siphons, bilaterally.  Calcifications are seen within  the lentiform nuclei.  There is no acute intracranial hemorrhage.  There  is no extra-axial fluid collection or midline shift.  No effacement of  sulci or of gyri.  The orbits are intact.  Visualized paranasal sinuses  and mastoid air cells are grossly clear.  Exam End: 04/26/23 11:51    Specimen Collected: 04/26/23 11:51 Last Resulted: 04/26/23 12:10   Received From: [facility]  Result Received: 11/15/23 08:18        [facility]  Outside Information  Results  CT FOOT WO IVCON LEFT (Order [id number])      suggestion  Information displayed in this report may not trend or trigger automated decision support.     CT FOOT WO IVCON LEFT  Order: [id number]  Impression    IMPRESSION:    No acute osseous abnormality of the left foot or ankle.        Transcribed Using Voice Recognition  Transcribe Date/Time: Oct  9 2023  1:54P    Dictated by: ALEX JERONIMO DO    This examination was interpreted and the report reviewed and   electronically signed by:  ALEX JERONIMO DO on Oct  9 2023  2:00PM  EST  Narrative    * * *Final Report* * *    DATE OF EXAM: Oct  9 2023 12:00PM      [facility]   [id number]  -  CT FOOT WO IVCON LT  / ACCESSION #  [id number]    PROCEDURE REASON: Fracture, foot        * * * * Physician Interpretation * * * *    RESULT: CT FOOT WO IVCON LT, CT ANKLE WO IVCON LT    HISTORY: Fracture, foot    TECHNIQUE: Noncontrast spiral CT scanning of the left ankle and foot was  performed in axial plane.  Coronal and sagittal reconstructions were  obtained from the original data set.    COMPARISON: 10/05/2023 left ankle radiographs.    CT Dose-Length Product (DLP): 221 mGy*cm  CT Dose Reduction Employed: Yes    RESULT:    No  acute fracture or dislocation.  Diffuse osteopenia.  Ankle mortise is  intact.  Mild tibiotalar degenerative change with joint space narrowing,  subchondral sclerosis, and osteophytic lipping.  Scattered mild  degenerative changes at the midfoot and IP joints.  Mild first MTP joint  degenerative change.  Diffuse fatty muscular atrophy, may relate to  sequela from neuropathy.  Small posterior and plantar calcaneal spurs.    No fluid collection identified within the constraints of this unenhanced  examination.  Vascular calcifications.     (topogram) images: No additional significant findings.  Exam End: 10/09/23 12:03    Specimen Collected: 10/09/23 12:00 Last Resulted: 10/09/23 14:02   Received From: [facility]  Result Received: 11/15/23 08:18      Lab Results   Component Value Date    [id number] 171 (L) 04/17/2023     Lab Results   Component Value Date    WBC 7.1 11/30/2023    HGB 9.0 (L) 11/30/2023    HCT 28.9 (L) 11/30/2023     (H) 11/30/2023     11/30/2023     Lab Results   Component Value Date    HGBA1C 5.3 11/30/2023       1. Anxiety and depression  -Continue  -Stable   - DULoxetine (Cymbalta) 60 mg DR capsule; Take 1 capsule (60 mg) by mouth once daily. PER DIRECTED  Dispense: 90 capsule; Refill: 3 (also used for chronic pain) (patient is not on 20 mg- Escript re-sent)   -Continue Bupropion  -Plan of care is ongoing     2. Major neurocognitive disorder (CMS/HCC)  -advanced-  - donepezil (Aricept) 10 mg tablet; Take 1 tablet (10 mg) by mouth once daily at bedtime. PER DIRECTED  Dispense: 90 tablet; Refill: 3  -Li needs to obtain legal guardianship  -Patient needs an expert evaluation  -See HPI  -Will discuss with Dr. Gaona-patient will require a telehealth visit as it is difficult to get to outpatient appointments  -Check Syphilis Screen with Reflex  -Check Vit B12, Folate, and Thiamine levels   -Plan of care is ongoing       3. Benign essential HTN  -well controlled on Lisinopril  and Carvedilol   -  4. Chronic constipation  -controlled on Miralax   -discussed meds that contribute to constipation  -add foods to diet that help promote BMs in this patient      5. Pain in joint of left foot/ Heel spur, left/Pain in left leg/Chronic Pain   -Etiology multifactorial   -continue pain medications including Tramadol 50 mg tid prn  -Will order Urine for Controlled Substance Monitoring(Compliance and Drug Screen   -Bring Controlled Substance Agreement to next visit    -    6. Osteopenia, unspecified location  -will discuss Calcium/Vitamin D supplementation at next visit    7. Heel spur, left  See Problems # 5 & 8     8. Osteoarthritis, localized, knee  See Problem # 5    9. Closed displaced intertrochanteric fracture of left femur, sequela  -recently discharged from PT   -Surgery 10/2023 -did do ST rehab at a facility  -Unfortunately, still is not able to ambulate  -Family feels she was just beginning to make some progress  -discussed re-enforcing and performing exercises recommend by PT  -Will re-evaluate   -Plan of care is ongoing     10. Vitamin B12 deficiency  - check level and CBCD     11. Type 2 diabetes mellitus with other specified complication, without long-term current use of insulin (CMS/[facility])  -check A1c  -Consider discontinuing all  oral medications       12. Macrocytosis  -see Problem Numbers 10 & 13  -also check folate level     13. Anemia, unspecified type  --see Problem Numbers 10 , 12 & 13    -Stable  -Routine follow up ( to be determine -expert eval)

## 2024-01-23 ENCOUNTER — HOME CARE VISIT (OUTPATIENT)
Dept: HOME HEALTH SERVICES | Facility: HOME HEALTH | Age: 89
End: 2024-01-23
Payer: MEDICARE

## 2024-01-23 ENCOUNTER — LAB (OUTPATIENT)
Dept: LAB | Facility: LAB | Age: 89
End: 2024-01-23
Payer: MEDICARE

## 2024-01-23 DIAGNOSIS — D75.89 MACROCYTOSIS: ICD-10-CM

## 2024-01-23 DIAGNOSIS — F41.9 ANXIETY AND DEPRESSION: ICD-10-CM

## 2024-01-23 DIAGNOSIS — E53.8 VITAMIN B12 DEFICIENCY: ICD-10-CM

## 2024-01-23 DIAGNOSIS — K59.09 CHRONIC CONSTIPATION: ICD-10-CM

## 2024-01-23 DIAGNOSIS — G89.29 OTHER CHRONIC PAIN: ICD-10-CM

## 2024-01-23 DIAGNOSIS — F03.90 MAJOR NEUROCOGNITIVE DISORDER (MULTI): ICD-10-CM

## 2024-01-23 DIAGNOSIS — F32.A ANXIETY AND DEPRESSION: ICD-10-CM

## 2024-01-23 DIAGNOSIS — E11.69 TYPE 2 DIABETES MELLITUS WITH OTHER SPECIFIED COMPLICATION, WITHOUT LONG-TERM CURRENT USE OF INSULIN (MULTI): ICD-10-CM

## 2024-01-23 DIAGNOSIS — D64.9 ANEMIA, UNSPECIFIED TYPE: ICD-10-CM

## 2024-01-23 LAB
ALBUMIN SERPL BCP-MCNC: 2.7 G/DL (ref 3.4–5)
ALP SERPL-CCNC: 37 U/L (ref 33–136)
ALT SERPL W P-5'-P-CCNC: 6 U/L (ref 7–45)
ANION GAP SERPL CALC-SCNC: 12 MMOL/L (ref 10–20)
AST SERPL W P-5'-P-CCNC: 6 U/L (ref 9–39)
BASOPHILS # BLD AUTO: 0.03 X10*3/UL (ref 0–0.1)
BASOPHILS NFR BLD AUTO: 0.4 %
BILIRUB SERPL-MCNC: 0.6 MG/DL (ref 0–1.2)
BUN SERPL-MCNC: 25 MG/DL (ref 6–23)
CALCIUM SERPL-MCNC: 8.6 MG/DL (ref 8.6–10.3)
CHLORIDE SERPL-SCNC: 108 MMOL/L (ref 98–107)
CO2 SERPL-SCNC: 27 MMOL/L (ref 21–32)
CREAT SERPL-MCNC: 1.02 MG/DL (ref 0.5–1.05)
EGFRCR SERPLBLD CKD-EPI 2021: 53 ML/MIN/1.73M*2
EOSINOPHIL # BLD AUTO: 0.05 X10*3/UL (ref 0–0.4)
EOSINOPHIL NFR BLD AUTO: 0.6 %
ERYTHROCYTE [DISTWIDTH] IN BLOOD BY AUTOMATED COUNT: 13.2 % (ref 11.5–14.5)
GLUCOSE SERPL-MCNC: 146 MG/DL (ref 74–99)
HCT VFR BLD AUTO: 30.1 % (ref 36–46)
HGB BLD-MCNC: 9.6 G/DL (ref 12–16)
IMM GRANULOCYTES # BLD AUTO: 0.04 X10*3/UL (ref 0–0.5)
IMM GRANULOCYTES NFR BLD AUTO: 0.5 % (ref 0–0.9)
LYMPHOCYTES # BLD AUTO: 1.64 X10*3/UL (ref 0.8–3)
LYMPHOCYTES NFR BLD AUTO: 21.1 %
MCH RBC QN AUTO: 32 PG (ref 26–34)
MCHC RBC AUTO-ENTMCNC: 31.9 G/DL (ref 32–36)
MCV RBC AUTO: 100 FL (ref 80–100)
MONOCYTES # BLD AUTO: 0.62 X10*3/UL (ref 0.05–0.8)
MONOCYTES NFR BLD AUTO: 8 %
NEUTROPHILS # BLD AUTO: 5.39 X10*3/UL (ref 1.6–5.5)
NEUTROPHILS NFR BLD AUTO: 69.4 %
NRBC BLD-RTO: 0 /100 WBCS (ref 0–0)
PLATELET # BLD AUTO: 196 X10*3/UL (ref 150–450)
POTASSIUM SERPL-SCNC: 4.6 MMOL/L (ref 3.5–5.3)
PROT SERPL-MCNC: 5.4 G/DL (ref 6.4–8.2)
RBC # BLD AUTO: 3 X10*6/UL (ref 4–5.2)
SODIUM SERPL-SCNC: 142 MMOL/L (ref 136–145)
WBC # BLD AUTO: 7.8 X10*3/UL (ref 4.4–11.3)

## 2024-01-23 PROCEDURE — 80053 COMPREHEN METABOLIC PANEL: CPT

## 2024-01-23 PROCEDURE — 83036 HEMOGLOBIN GLYCOSYLATED A1C: CPT

## 2024-01-23 PROCEDURE — 82746 ASSAY OF FOLIC ACID SERUM: CPT

## 2024-01-23 PROCEDURE — 86780 TREPONEMA PALLIDUM: CPT

## 2024-01-23 PROCEDURE — 80307 DRUG TEST PRSMV CHEM ANLYZR: CPT

## 2024-01-23 PROCEDURE — 36415 COLL VENOUS BLD VENIPUNCTURE: CPT

## 2024-01-23 PROCEDURE — 84425 ASSAY OF VITAMIN B-1: CPT

## 2024-01-23 PROCEDURE — 82607 VITAMIN B-12: CPT

## 2024-01-23 PROCEDURE — 85025 COMPLETE CBC W/AUTO DIFF WBC: CPT

## 2024-01-24 LAB
EST. AVERAGE GLUCOSE BLD GHB EST-MCNC: 105 MG/DL
FOLATE SERPL-MCNC: 7.4 NG/ML
HBA1C MFR BLD: 5.3 %
TREPONEMA PALLIDUM IGG+IGM AB [PRESENCE] IN SERUM OR PLASMA BY IMMUNOASSAY: NONREACTIVE
VIT B12 SERPL-MCNC: 108 PG/ML (ref 211–911)

## 2024-01-25 LAB
AMPHETAMINES SERPL QL SCN: NEGATIVE NG/ML
ANNOTATION COMMENT IMP: NORMAL
BARBITURATES SERPL QL SCN: NEGATIVE NG/ML
BENZODIAZ SERPL QL SCN: NEGATIVE NG/ML
BUPRENORPHINE SERPL-MCNC: NEGATIVE NG/ML
CANNABINOIDS SERPL QL SCN: NEGATIVE NG/ML
COCAINE SERPL QL SCN: NEGATIVE NG/ML
METHADONE SERPL QL SCN: NEGATIVE NG/ML
METHAMPHET SERPL QL: NEGATIVE NG/ML
OPIATES SERPL QL SCN: NEGATIVE NG/ML
OXYCODONE SERPL QL: NEGATIVE NG/ML
PCP SERPL QL SCN: NEGATIVE NG/ML

## 2024-01-28 LAB — VIT B1 PYROPHOSHATE BLD-SCNC: 90 NMOL/L (ref 70–180)

## 2024-02-20 ENCOUNTER — OFFICE VISIT (OUTPATIENT)
Dept: GERIATRIC MEDICINE | Facility: CLINIC | Age: 89
End: 2024-02-20
Payer: MEDICARE

## 2024-02-20 VITALS
TEMPERATURE: 98.5 F | HEART RATE: 72 BPM | DIASTOLIC BLOOD PRESSURE: 60 MMHG | RESPIRATION RATE: 20 BRPM | SYSTOLIC BLOOD PRESSURE: 120 MMHG

## 2024-02-20 DIAGNOSIS — K21.9 GASTROESOPHAGEAL REFLUX DISEASE WITHOUT ESOPHAGITIS: ICD-10-CM

## 2024-02-20 DIAGNOSIS — G89.29 OTHER CHRONIC PAIN: ICD-10-CM

## 2024-02-20 DIAGNOSIS — M17.10 OSTEOARTHRITIS, LOCALIZED, KNEE: ICD-10-CM

## 2024-02-20 DIAGNOSIS — Z74.09 IMPAIRED MOBILITY: ICD-10-CM

## 2024-02-20 DIAGNOSIS — J44.9 CHRONIC OBSTRUCTIVE PULMONARY DISEASE, UNSPECIFIED COPD TYPE (MULTI): ICD-10-CM

## 2024-02-20 DIAGNOSIS — K57.92 DIVERTICULITIS: ICD-10-CM

## 2024-02-20 DIAGNOSIS — R10.9 ABDOMINAL PAIN, UNSPECIFIED ABDOMINAL LOCATION: Primary | ICD-10-CM

## 2024-02-20 PROCEDURE — 99349 HOME/RES VST EST MOD MDM 40: CPT | Performed by: NURSE PRACTITIONER

## 2024-02-20 PROCEDURE — 3078F DIAST BP <80 MM HG: CPT | Performed by: NURSE PRACTITIONER

## 2024-02-20 PROCEDURE — 1036F TOBACCO NON-USER: CPT | Performed by: NURSE PRACTITIONER

## 2024-02-20 PROCEDURE — 1159F MED LIST DOCD IN RCRD: CPT | Performed by: NURSE PRACTITIONER

## 2024-02-20 PROCEDURE — 1160F RVW MEDS BY RX/DR IN RCRD: CPT | Performed by: NURSE PRACTITIONER

## 2024-02-20 PROCEDURE — 1125F AMNT PAIN NOTED PAIN PRSNT: CPT | Performed by: NURSE PRACTITIONER

## 2024-02-20 PROCEDURE — 3074F SYST BP LT 130 MM HG: CPT | Performed by: NURSE PRACTITIONER

## 2024-02-20 RX ORDER — FAMOTIDINE 40 MG/1
40 TABLET, FILM COATED ORAL DAILY
Qty: 30 TABLET | Refills: 11 | Status: SHIPPED | OUTPATIENT
Start: 2024-02-20 | End: 2025-02-19

## 2024-02-20 RX ORDER — TRAMADOL HYDROCHLORIDE 50 MG/1
50 TABLET ORAL 2 TIMES DAILY PRN
Qty: 60 TABLET | Refills: 1 | Status: SHIPPED | OUTPATIENT
Start: 2024-02-20 | End: 2024-03-13 | Stop reason: WASHOUT

## 2024-02-20 RX ORDER — ALBUTEROL SULFATE 0.83 MG/ML
2.5 SOLUTION RESPIRATORY (INHALATION) 2 TIMES DAILY PRN
Qty: 75 ML | Refills: 3 | Status: SHIPPED | OUTPATIENT
Start: 2024-02-20

## 2024-02-20 ASSESSMENT — PAIN SCALES - GENERAL: PAINLEVEL: 8

## 2024-02-21 DIAGNOSIS — K21.9 GASTROESOPHAGEAL REFLUX DISEASE WITHOUT ESOPHAGITIS: ICD-10-CM

## 2024-02-21 RX ORDER — FAMOTIDINE 40 MG/1
TABLET, FILM COATED ORAL
Qty: 90 TABLET | OUTPATIENT
Start: 2024-02-21

## 2024-02-21 NOTE — PROGRESS NOTES
Some elements may have been copied from prior note(s). The elements have been updated and reflect current evaluation, examination and decision making from today.           Some elements may have been copied from prior note(s). The elements have been updated and reflect current evaluation, examination and decision making from today.             Reason for visit:  Follow up for OA/Pain            Summary Statement: : Mrs. 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, HFpEF ( 4/2023 ECHO EF 55%-60%) , CKD Stage III, DM II, chronic abdominal pain, bladder mass -, PUD, , impaired mobility secondary to OA of bilateral knees , , hypomagnesia, nausea, OAB, per medical record : diverticulosis, occasional rectal bleeding,, EDWARD,, vitamin B12 anemia, obesity, UTI ((treated in hospital 5/2023), and cataracts     Urothelial carcinoma in situ ( found on page 32 of [facility] medical record for 4/23/1922  -Per medical record 2023- family declining any further workup   PSH & Procedures :  Total Hysterectomy  Cholecystectomy  R temporal artery biopsy      Reason for homebound status: Leaving her home requires a considerable and taxing amount of effort due to pain in knees secondary OA.         HPI:  Mrs. Edwards is being seen today in her home in the presence of her daughter, Li, and her daughter, Keshia, later attended.  Li's aunt provide information also.      Patient is a poor historian due to advanced dementia.     When NP arrived, Li is somewhat distraught.  States her furnace went out today and cannot be repaired.  She has the doors to unused rooms closed and an electric heater is in the patient's room. It is warm  Patient has blankets and she denies being cold.  "    NP suggested calling \"211\" which they had but states it forwards you to a non-working phone number. They also called the [facility] to inquire about free furnaces for seniors.     However, NP noticed that patient has returned to the intermittent episodes of screaming \"Oh God \".  NP asked what was wrong and she said \"My stomach is hurting\".  Li states this started about one week her.  Her Aunt stated \"we were just able to calm her down\" This continued the duration of the visit.  NP expressed that her symptoms were consistent when she had diverticulitis, and recommended an ED evaluation as patient needs a CT Scan of the abdomen.     States patient has been eating and moving bowels.  She remains non-ambulatory since hip replacement.  Needs refill on Tramadol.  States it is effective along with Tylenol . DENISE left a note that she feels that Tylenol should be given once  a day-no explanation provided.     Clarified Donepezil dosage - has 5 mg and 10 mg- should be on 10 mg daily   States she gives patient 2 albuterol aerosol treatments daily because a doctor in the past told her to do so.  NP explained that based upon past and present exams, patient does not need this and should be given prn .   Requesting refills on Tramadol, Albuterol, and Switching Omeprazole.     ROS:  See HPI  No vomiting  No blood in stool or urine  No falls ED visits or hospitalizations    Physical Exam  Constitutional   General appearance: lying in bed . Moderate distress secondary to abdominal pain   Head and Face Examination/ inspection of hair and scalp: Normal.   Eyes   Inspection of eyes: Sclera and conjunctiva were normal.~   Pupil exam: MILADYS. 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   Ears, Nose, Mouth, and Throat   Ears: Normal.~   Oropharynx: Normal with moist mucus membranes, tongue midline, no PND, no erythema or enlargement of tonsils.~   Hearing: Normal. ~   Nasal mucosa, septum, and turbinates: Normal without " edema or erythema.~   Lips, teeth, and gums: Abnormal.  The patient was edentulous.   Neck   Neck Exam: Appearance of the neck was normal. No neck masses observed. No jugular vein distension.   Pulmonary   Respiratory assessment: No respiratory distress, normal respiratory rhythm and effort.~   Auscultation of Lungs: Clear bilateral breath sounds. No rales, rhonchi or wheezes.   Cardiovascular   Auscultation of heart: Apical pulse normal, heart rate and rhythm normal, normal S1 and S2, no murmurs, no gallops and no pericardial rub.~   Pedal pulses: Regular rate. 2+ bilaterally.~   Exam for edema: No peripheral edema.   Chest   Chest: Symmetrical and normal appearance.   Abdomen   Abdominal Exam: No bruits normal bowel sounds, soft, non-tender, no abdominal masses palpated. Bowel sounds were normal. The abdomen was soft and nontender. The abdomen was not firm. No guarding. no masses palpated. The abdomen was normal to percussion., but slight tenderness with percussion to RUQ. no CVA tenderness~   Liver and Spleen exam: No hepato-splenomegaly.   Genitourinary   Bladder: Normal on palpation.~wearing a diaper-.   Lymphatic   Palpation of lymph nodes in axillae: Normal.   Palpation of lymph nodes in neck: No cervical lymphadenopathy  Palpation of lymph nodes in other areas: Normal.    Musculoskeletal   Inspection of digits and nails: No clubbing or cyanosis of the fingernails   Inspection/palpation of joints: No joint swelling seen.  Range of Motion: Normal movement of all extremities  Muscle strength/tone: Normal.    Skin   Skin inspection: Skin dry, warm and intact. Normal skin color and pigmentation, normal skin turgor and no visible rash .   Neurologic   Cranial nerves: No tremors.  Poor memory.   Follows simple commands   Psychiatric   Judgment and insight: Intact and appropriate.~   Orientation to  person    Mood and affect: Normal.~   Recent and remote memory: Abnormal.            Chemistry    Lab Results    Component Value Date/Time     01/23/2024 1217    K 4.6 01/23/2024 1217     (H) 01/23/2024 1217    CO2 27 01/23/2024 1217    BUN 25 (H) 01/23/2024 1217    CREATININE 1.02 01/23/2024 1217    Lab Results   Component Value Date/Time    CALCIUM 8.6 01/23/2024 1217    ALKPHOS 37 01/23/2024 1217    AST 6 (L) 01/23/2024 1217    ALT 6 (L) 01/23/2024 1217    BILITOT 0.6 01/23/2024 1217        Lab Results   Component Value Date    WBC 7.8 01/23/2024    HGB 9.6 (L) 01/23/2024    HCT 30.1 (L) 01/23/2024     01/23/2024     01/23/2024     Lab Results   Component Value Date    HGBA1C 5.3 01/23/2024       RxSearch >  Patient Request  Tomas Talbert 88M  Refine Search  Contact the [employer]  YOB: 1935  Recent Address:  [address] [place] [address]  View Linked Records (3)  Other Tools/Metrics  One or more patients do not exactly match the search patient demographic information that was sent during search: Tomas Edwards, 1935.  The report displayed is for the next best possible match. Please verify the report is for the intended patient before proceeding.    [facility]  Report generated on 02/20/2024. Report Date Range: 02/21/2022 - 02/21/2024  PDF Report  [place]  UNINTENTIONAL OVERDOSE RISK SCORE MODEL  How should I use this information?  BELOW AVERAGE  070  NARX SCORES  NARCOTICS  300  ACTIVE RX  0  SEDATIVES  210  ACTIVE RX  0  STIMULANTS  000  ACTIVE RX  0  KEY CONTRIBUTING FACTORS TO OVERDOSE RISK SCORE MODEL  Greater than six dispensations  Yes  Benzo - Narcotics overlap  7 Days  Number of high risk scripts  0  Number of pharmacies where narcotics/sedatives/stimulants filled  2  Total days supply of short-acting drugs  132  i  State Indicators (1)  >= 5 opioid or sedative providers in any year in the last 2 years  Details  RX Graph   Narcotic   Buprenorphine   Sedative   Stimulant   Other  Learn how to use graph  All  Prescribers  Prescribers  8 - Karolina Martínez  7 - Trang Isaacs  6 - Rodolfo Irizarry MD  5 - Laina Hampton MD  4 - Christa Minor,  3 - Wolf Meeks,  2 - Rosana Yip  1 - Deanne Mayes  Timeline  02/20  2m  6m  1y  2y  Disclaimer  Morphine Milligram Equivalent (MME) Prescribed Over Time  Last 30 Days  Last 60 Days  Last 90 Days  Last 1 Year  Last 2 Years  MME  Timeframe  0  1  2  1/22/24 2/6/24 2/20/24  0  Avg MME/day  0  MME per Rx  Disclaimer  Lorazepam MgEq (LME) Prescribed Over Time  Last 30 Days  Last 60 Days  Last 90 Days  Last 1 Year  Last 2 Years  LME  Timeframe  0  1  2  1/22/24 2/6/24 2/20/24  0  Avg LME/day  0  LME per Rx  Disclaimer  Buprenorphine (mg) Prescribed Over Time  Last 30 Days  Last 60 Days  Last 90 Days  Last 1 Year  Last 2 Years  mg  Timeframe  0  1  2  1/22/24 2/6/24 2/20/24  0  Avg mg/day  0  Avg mg per Rx  Disclaimer  RX Summary  Summary  Total Prescriptions 21  Total Private Pay 12  Total Prescribers 8  Total Pharmacies 2  Narcotics (excluding Buprenorphine)  Current MME/day 0.00  30 Day Avg MME/day 0.00  Current Qty 0  Buprenorphine  Current mg/day 0.00  30 Day Avg mg/day 0.00  Current Qty 0  RX Summary Expanded  Narcotics (excluding Buprenorphine)  Current MME/day 0.00  30 Day Avg MME/day 0.00  90 Day Avg MME/day 6.67  Rx Count/12 Months 14  Prescriber #/6 Months 4  Pharmacy #/6 Months 2  Current Qty 0  Buprenorphine  Current mg/day 0.00  30 Day Avg mg/day 0.00  90 Day Avg mg/day 0.00  Rx Count/12 Months 0  Prescriber #/6 Months 0  Pharmacy #/6 Months 0  Current Qty 0  Sedatives  30 Day Avg LME/day 0.00  90 Day Avg LME/day 0.00  Rx Count/12 Months 1  Prescriber #/6 Months 1  Pharmacy #/6 Months 1  Current Qty 0  Stimulants  30 Day Avg mg/day 0.00  90 Day Avg mg/day 0.00  Rx Count/12 Months 0  Prescriber #/6 Months 0  Pharmacy #/6 Months 0  Current Qty 0  Prescriptions  Total: 21  Private Pay: 12  Showing 1-15 of 21 Items View 15 Items    1   of 2   Filled   Written  ID  Drug  QTY  Days  Prescriber  RX #  Dispenser  Refill  Daily Dose*  Pymt Type     12/19/2023 12/18/2023 3   Tramadol Hcl 50 Mg Tablet  60.00 30 De Bro [id number] [facility] (4130) 0 20.00 MME [facility] [place]  11/09/2023 11/08/2023 3   Tramadol Hcl 50 Mg Tablet  7.00 4 Kr Duv [id number] [facility] (4130) 0 17.50 MME [place]  10/27/2023 10/27/2023 2   Tramadol Hcl 50 Mg Tablet  15.00 10 Kr Duv [id number] Abs (6899) 0 15.00 MME Private Pay [place]  10/20/2023 10/20/2023 2   Oxycodone Hcl (Ir) 5 Mg Tablet  14.00 7 Kr Duv [id number] Abs (6899) 0 15.00 MME Private [facility]  10/20/2023 10/20/2023 2   Oxycodone Hcl (Ir) 5 Mg Tablet  6.00 6 Kr Duv [id number] Abs (6899) 0 7.50 MME [facility]  10/16/2023 10/16/2023 2   Oxycodone Hcl (Ir) 5 Mg Tablet  27.00 14 Kr Duv [id number] Abs (6899) 0 14.46 MME [facility]  10/16/2023 10/16/2023 2   Alprazolam 0.25 Mg Tablet  19.00 7 Kr Duv [id number] Abs (6899) 0 1.36 LME [facility]  10/10/2023 10/09/2023 2   Oxycodone Hcl (Ir) 5 Mg Tablet  1.00 1 Vi Luis [id number] Abs (6899) 0 7.50 MME [facility]  09/01/2023 09/01/2023 3   Oxycodone-Acetaminophen 5-325  60.00 10 Sw Ng, [id number] [facility] (4130) 0 45.00 MME Comm [place]  08/17/2023 08/17/2023 3   Oxycodone-Acetaminophen 5-325  9.00 3 Kr Duv [id number] [facility] (4130) 0 22.50 MME [place]  08/04/2023 08/04/2023 2   Oxycodone-Acetaminophen 5-325  26.00 7 Kr Duv [id number] Abs (5799) 0 27.86 MME Private Pay [place]  07/21/2023 07/21/2023 2   Oxycodone-Acetaminophen 5-325  30.00 4 Kr Duv [id number] Abs (6899) 0 56.25 MME Private Pay [place]  07/20/2023 07/20/2023 2   Oxycodone-Acetaminophen 5-325  8.00 1 Al Ear [id number] Abs (3099) 0 60.00 MME Private [place]  05/17/2023 05/16/2023 3   Tramadol Hcl 50 Mg Tablet  90.00 30 De Bro [id number] [facility] (5339) 0 30.00 MME [place]  05/06/2023 05/06/2023 3   Tramadol Hcl 50 Mg Tablet  15.00 5 Ga Gre [id number] Wal (7800) 0 30.00 MME [place]  Disclaimer  Showing 1-15 of 21 Items View 15 Items    1   of 2   Providers  Total: 8  Showing 1-8  "of 8 Items View 15 Items  1 of 1   Name  Address  City  State  Zipcode  Phone   Deanne Painter MD [address] [facility] [address] [phone]  Christa Minor MD [address] [facility] [address] [phone]  Rodolfo Irizarry MD [address] [facility] [address] [phone]  Rosana Ramirez [address] [facility] [address] [phone]  Wolf Meeks MD [address] [facility] [address] [phone]  Laina Hampton MD [facility] [place] [address] [phone]  Trang Isaacs, MSN [address] [facility] [address] [phone]  Karolina Martínez [address] [facility] [address] [phone]  Showing 1-8 of 8 Items View 15 Items  1 of 1   Pharmacies  Total: 2  Showing 1-2 of 2 Items View 15 Items  1 of 1   Name  Address  City  State  Zipcode  Phone   [employer] [phone]) [phone] [facility] [address] [phone]  [employer] (9227) [address] [facility] [address] [phone]  Showing 1-2 of 2 Items View 15 Items  1 of 1   Column Settings  Integrated Patient Records  Total: 0  Showing 1-0 of 0 Items View 15 Items  1 of 0   Incident Date  Medication Given  Dosage  Administrated By  Zip code of Administration   Showing 1-0 of 0 Items View 15 Items  1 of 0   The [facility] does not warrant the information contained in this report to be accurate or complete. The Report reflects the search criteria entered by the requestor, the data entered by the dispensing pharmacy, and the frequency at which the data is reported. For more information about any prescription, please contact the dispensing pharmacy or the prescriber.  This website uses Popset, a web analytics service provided by [employer] (\"Google\"). Popset uses \"cookies\", which are text files placed on your computer, to help the website analyze how users use the site. The information generated by the " cookie about your use of the website will be transmitted to and stored by [employer] on servers in the [place].    In case IP-anonymization is activated on this website, your IP address will be truncated within the area of Member States of the  Union or other parties to the Agreement on the  Economic Area. Only in exceptional cases the whole IP address will be first transferred to a Google  in the [place] and truncated there. The IP-anonymization is active on this website.    [employer] will use this information on behalf of the  of this website for the purpose of evaluating your use of the website, compiling reports on website activity for website operators and providing them other services relating to website activity and internet usage.    The IP-address, that your Browser conveys within the scope of [employer], will not be associated with any other data held by [employer]. You may refuse the use of cookies by selecting the appropriate settings on your browser, however please note that if you do this you may not be able to use the full functionality of this website. You can also opt-out from being tracked by [employer] with effect for the future by downloading and installing [employer] Opt-out Browser Addon for your current web browser: http://tools.[employer].com/dlpage/gaoptout?hl=en.    Powered By  [employer]  [address] [place] [address]  [address], [place] [address]  info@pharmacy.[place].gov  ©2024 [employer]. All Rights Reserved. Privacy Policy    1. Other chronic pain/Osteoarthritis, localized, knee  -Controlled on low dose Tramadol, along with Duloxetine and Tylenol   -No s/s of adverse effects  -Oarrs report did not generate automatically  -received and reviewed manually- no discrepancies  -Escript sent     2. Abdominal pain, unspecified abdominal location/ Diverticulitis  - hospitalized in the past for diverticulitis  -pain uncontrolled   -recommended ED evaluation  for CT of Abdomen    3. Gastroesophageal reflux disease without esophagitis  - paying for Omeprazole out of pocket  -Will see if insurance will cover Famotidine    4. Impaired mobility  - remains wheelchair bound  -Total care -able to feed self     5. COPD  -stable  -discussed not overusing Albuterol  -change to twice a day as needed only      -Follow up after ED evaluation 4-6 weeks or sooner if needed

## 2024-02-21 NOTE — PATIENT INSTRUCTIONS
Dear Mrs. Edwards,    Sorry you are not feeling well.  I do recommend that you go to the ED to be evaluated.    I will follow up with you in a few weeks.      Sincerely,    Karolina Martínez, MSN, APRN-BC

## 2024-03-12 ENCOUNTER — OFFICE VISIT (OUTPATIENT)
Dept: GERIATRIC MEDICINE | Facility: CLINIC | Age: 89
End: 2024-03-12
Payer: MEDICARE

## 2024-03-12 DIAGNOSIS — Z71.89 ADVANCED CARE PLANNING/COUNSELING DISCUSSION: ICD-10-CM

## 2024-03-12 DIAGNOSIS — L50.9 URTICARIA: ICD-10-CM

## 2024-03-12 DIAGNOSIS — R68.89 SUSPECTED LUNG CANCER: ICD-10-CM

## 2024-03-12 DIAGNOSIS — C68.9 UROTHELIAL CARCINOMA (MULTI): Primary | ICD-10-CM

## 2024-03-12 DIAGNOSIS — C67.9 MALIGNANT NEOPLASM OF URINARY BLADDER, UNSPECIFIED SITE (MULTI): ICD-10-CM

## 2024-03-12 DIAGNOSIS — R10.10 PAIN OF UPPER ABDOMEN: ICD-10-CM

## 2024-03-12 PROCEDURE — 99497 ADVNCD CARE PLAN 30 MIN: CPT | Performed by: NURSE PRACTITIONER

## 2024-03-12 PROCEDURE — 3077F SYST BP >= 140 MM HG: CPT | Performed by: NURSE PRACTITIONER

## 2024-03-12 PROCEDURE — 3078F DIAST BP <80 MM HG: CPT | Performed by: NURSE PRACTITIONER

## 2024-03-12 PROCEDURE — 99349 HOME/RES VST EST MOD MDM 40: CPT | Performed by: NURSE PRACTITIONER

## 2024-03-12 PROCEDURE — 1125F AMNT PAIN NOTED PAIN PRSNT: CPT | Performed by: NURSE PRACTITIONER

## 2024-03-12 PROCEDURE — 1159F MED LIST DOCD IN RCRD: CPT | Performed by: NURSE PRACTITIONER

## 2024-03-12 PROCEDURE — 1160F RVW MEDS BY RX/DR IN RCRD: CPT | Performed by: NURSE PRACTITIONER

## 2024-03-12 PROCEDURE — 1036F TOBACCO NON-USER: CPT | Performed by: NURSE PRACTITIONER

## 2024-03-12 ASSESSMENT — PAIN SCALES - GENERAL: PAINLEVEL: 8

## 2024-03-12 NOTE — PROGRESS NOTES
Some elements may have been copied from prior note(s). The elements have been updated and reflect current evaluation, examination and decision making from today.             Reason for visit:  follow up s/p ED visit for abdominal pain     Summary Statement: : Mrs. 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, HFpEF ( 4/2023 ECHO EF 55%-60%) , CKD Stage III, DM II, chronic abdominal pain, bladder mass -, PUD, , impaired mobility secondary to OA of bilateral knees , , hypomagnesia, nausea, OAB, per medical record : diverticulosis, occasional rectal bleeding,, EDWARD,, vitamin B12 anemia, obesity, UTI ((treated in hospital 5/2023), cataracts, 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  These findings findings favor a sidebranch IPMN versus a benign serious cystic neoplasm.  No pancreatic   head mass identified, and cataracts.      Other Pertinent Information  -Urothelial carcinoma in situ ( found on page 32 of F medical record for 4/23/1922  -Per medical record 2023 and current - family declining any further workup     PSH & Procedures :  Total Hysterectomy  Cholecystectomy  R temporal artery biopsy      Reason for homebound status: Leaving her home requires a considerable and taxing amount of effort due to pain in knees secondary OA.         HPI:  Mrs. Edwards is being seen today in her home in the presence of her daughter, Li, and th patient's sister.    Patient is a poor historian due to  advanced dementia.   Patient was last seen by this NP in the home on 2/202/24 for routine follow up     Patient was found to be a lot of pain (abdominal).  NP recommended ED evaluation due to concern for possible diverticulitis.     Per medical record [facility] 2/27/2024 :  Signed       SUSPECT LUNG AND BLADDER MALIGNANCY. DISCUSSED W DAUGHTER PROSPER WHO REQUESTS NO INVASIVE PROCEDURES OR CHEMOTHERAPY. AGREES W DISCHARGE TO HOME. PATIENT IS COMFORTABLE, EATING, AND WANTS TO GO HOME. WILL REQUEST HOSPICE AT HOME CONSULT. WILL FOLLOW IN OFFICE.            Electronically signed by Wolf Meeks MD at 2/27/2024  9:11 AM     CURRENT HOSPITAL COURSE  Pt presents with abdominal pain. CT abdomen shows prominence of pacreatic duct.       2/28/2024 Documentation from [facility]-    Miscellaneous Notes  - documented in this encounter  Telephone Encounter - Judy Lara LISW - 02/28/2024 9:47 AM EST  Formatting of this note might be different from the original.  Received request to follow-up with pt family regarding hospice conversation which began while pt was in the hospital. Called pts Prosper lux [phone] [facility] to call back at her convenience to further discuss.  Pts Prosper called back today. She states they are not quite ready for hospice care. She understands they might be ready for hospice in the near future. She was appreciative of the call and follow. She welcomes a follow-up call in a week. Will follow  (END)    At the time of visit, patient is in bed eating eggs for breakfast.  She looks thinner compared to the last visit, but Prosper states her appetite is still good.     Approximately every 10 minutes, patient screams and sometimes cries.  She complains of abdominal pain.  +facial grimacing with light palpation to epigastric area and left upper quadrant.     Prosper states patient moves her bowels very well.   It is 11:00 AM and the last dose of Tramadol was given last night, but she gave her Tylenol this  morning.  Li states both are ineffective. Advised to increase Tramadol to 100 mg.  A dose was given at the time of visit.     Li states patient also has been complaining of itching.    No rashes or lesions present, and no new medications or foods.       ADVANCE CARE PLANNING DISCUSSION:  See ASSESSMENT/PLAN Section of this note .    Interval History: Recent illness, hospitalizations or surgeries since last visit: x 1 previous hospitalizations. 2/20/2024    Current Diet: Regular.   Appetite: Very good  Food Consistency: Regular.   Liquids Consistency: thin.   Gait/Mobility: not ambulating- OOB to [facility] with assist x one person.   Bathing: dependent.   Dressing: dependent.   Walking: dependent.   Toileting: dependent.   Feeding: performs independently.   Personal Hygiene: dependent.   Bowels: continent.   Bladder: occasional accident.   Managing Finances: dependent,~Li.   Shopping: dependent.   Managing Medications: dependent.   Housework / Basic Home Maintenance: dependent.   Laundry: dependent.   Preparing Meals: dependent.    Falls Risk Screening:. DARIA has not fallen in the last 6 months.   Home safety risk factors: none.   Patient's DNR Status:DNRCC (needs form signed)         -Controlled Substance Monitoring Medication   #1 Medication Name: Tramadol,~Pill Count: Yes  Recent Lab Results:  N/A  Signs of Misuse: no.   Signs of Abuse: no  Signs of Diversion: no       ROS:  Unable to obtain due to cognitive impairment  Li states no vomiting, diarrhea or constipation  No dysuria or hematuria      Physical Exam:     /60 (BP Location: Left arm, Patient Position: Sitting, BP Cuff Size: Adult)   Pulse 64   Temp 36.9 °C (98.5 °F) (Temporal)   Resp 22 /POX=95% RA     Constitutional   General appearance: Sitting upright in bed eating breakfast.  In mild distress secondary to pain.  Looks thinner compared to last visit.   Head and Face Examination/ inspection of hair and scalp: Normal.   Eyes    Inspection of eyes: Sclera and conjunctiva were normal.~   Pupil exam: PERRLA. Extraocular movements were intact. 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   Ears, Nose, Mouth, and Throat   Ears: Normal.~   Oropharynx: Normal with moist mucus membranes, tongue midline, no PND, no erythema or enlargement of tonsils.~   Hearing: Normal. ~   Nasal mucosa, septum, and turbinates: Normal without edema or erythema.~   Lips, teeth, and gums: Abnormal.  The patient was edentulous.   Neck   Neck Exam: Appearance of the neck was normal. No neck masses observed. No jugular vein distension.   Pulmonary   Respiratory assessment: No respiratory distress, normal respiratory rhythm and effort.~   Auscultation of Lungs: Clear bilateral breath sounds. No rales, rhonchi or wheezes.   Cardiovascular   Auscultation of heart: Apical pulse normal, heart rate and rhythm normal, normal S1 and S2, no murmurs, no gallops and no pericardial rub.~   Pedal pulses: Regular rate. 2+ bilaterally.~   Exam for edema: No peripheral edema.   Chest   Chest: Symmetrical and normal appearance.   Abdomen   Abdominal Exam:   No bruits normal bowel sounds, soft, non-tender, no abdominal masses palpated.   Bowel sounds were normal.   The abdomen was soft and tender to touch to epigastric area and left upper quadrant   Liver and Spleen exam: No hepato-splenomegaly.   Genitourinary   Bladder: Normal on palpation.~wearing a diaper-.   Lymphatic   Palpation of lymph nodes in axillae: Normal. ~   Palpation of lymph nodes in neck: No cervical lymphadenopathy.~   Palpation of lymph nodes in other areas: Normal.    Musculoskeletal   Inspection of digits and nails: No clubbing or cyanosis of the fingernails.~   Inspection/palpation of joints: No joint swelling seen.~   Range of Motion: Normal movement of all extremities.~   Muscle strength/tone: Normal.    Skin   Skin inspection:   -no jaundice  -Skin dry, warm and intact.  -No rashes or lesions  -Loose skin to arms.   Neurologic    Cranial nerves: No tremors.   Follows simple commands   Otherwise intact   Psychiatric   Judgment and insight: Intact and appropriate.~   Orientation to  person  -did not know year, or name of President   Mood and affect: Normal.~   Recent and remote memory: Abnormal.       2/23/2024 CT angio abdomen pelvis w and or wo IV IV contrast  Order: [id number]  Impression    IMPRESSION:    Aorta/vascular:  1.  Moderate calcified aortoiliac atherosclerotic plaque with no acute  pathology or intra-abdominal vascular occlusion.  2.  Proximal left common femoral artery again appears largely occluded,  dating back to at least 07/15/2023.    Abdomen/pelvis:  1.  Incompletely-imaged 3.2 x 2.9 cm masslike opacity perihilar left  upper lobe; and 0.9 cm enhancing structure along the right lateral  urinary bladder wall.  07/15/2023 Emergency Department provider note  describes known lung and bladder cancers.  2.  Enteric diverticulum again noted.      2/22/2024 Results  MR 3D post processing w report 2nd workstation (Order [id number])     MR 3D post processing w report 2nd workstation  Order: [id number]  Impression    Motion degraded imaging limiting evaluation.    Lobulated cystic lesion in the pancreatic body associated with mild  distal pancreatic body and tail dilation.  These findings findings favor  a sidebranch IPMN versus a benign serious cystic neoplasm.  No pancreatic  head mass identified.    Cholecystectomy with mild to moderate intrahepatic and extra hepatic  biliary dilation.  No choledocholithiasis or other outlet obstruction  identified.    Additional findings as detailed in the report.            Transcribed Using Voice Recognition  Transcribe Date/Time: Feb 23 2024  8:51A    Dictated by: MAICOL CORONA MD    This examination was interpreted and the report reviewed and  electronically signed by:  MAICOL CORONA MD on Feb 23 2024  9:27AM  EST  Narrative    * * *Final Report* * *    DATE OF EXAM: Feb 22 2024  6:07PM       [facility]   0280  -  MRI 3D POST PROCESSING  / ACCESSION #  [id number]    PROCEDURE REASON: Abdominal pain, acute, nonlocalized        * * * * Physician Interpretation * * * *    RESULT: EXAMINATION:  MRI PANC/JHONNY WO/W IVCON, MRI 3D POST PROCESSING    CLINICAL HISTORY:   Jaundice, abd pain, fever or biliary surgery or  gallstones    Technique:   MRI PANC/JHONNY WO/W IVCON, MRI 3D POST PROCESSING.    Contrast Media: IV administration of  15 ml of Dotarem    Comparison: CT abdomen pelvis 02/20/2024      RESULT:    Motion degraded imaging.    Abdomen:  Pancreas: Lobulated cystic lesion along the pancreatic body measuring 1.3  x 1.0 cm (6:28); there is mild associated distal pancreatic duct dilation   measuring up to 5-6 mm.  There is normal caliber of the pancreatic duct  proximal to this lesion the pancreatic head.  No pancreatic head mass  identified.  Biliary: Cholecystectomy.  Mild intrahepatic and extrahepatic biliary  dilation.  No discrete outlet obstruction.  Liver: No convincing evidence of steatosis within the limits of the  study.  No discrete enhancing lesion.  Normal hepatic morphology.  Spleen: Redemonstration of subcentimeter splenic lesion favoring a  hemangioma on MRI.  Adrenal glands: No mass  Kidneys: No hydronephrosis.  No enhancing mass.  Mesentery/peritoneum/retroperitoneum: No mass.  No ascites.  No  lymphadenopathy identified within the limits of the study.  Bowel: No dilated bowel.  Vasculature: No abdominal aortic aneurysm.  Bones/soft tissue: No marrow replacing process or fracture.  Minimal  nonspecific subcutaneous edema along the posterolateral findings  alignment.  No organized fluid collection.  Lung bases: Unremarkable.  Localizer images: No additional findings.  Exam End: 02/22/24 18:35    Specimen Collected: 02/22/24 18:07 Last Resulted: 02/23/24 09:29   Received From: [facility]  Result Received: 03/12/24 07:44              Transcribed Using Voice Recognition  Transcribe Date/Time:  Feb 23 2024  7:25P    Dictated by: FAUSTINA RIVERA MD    This examination was interpreted and the report reviewed and  electronically signed by:  FAUSTINA RIVERA MD on Feb 23 2024  7:51PM  EST  Narrative    * * *Final Report* * *    DATE OF EXAM: Feb 23 2024  6:08PM      [facility]   0311  -  CTA ABD/PELV W IVCON  / ACCESSION #  [id number]    PROCEDURE REASON: Mesenteric ischemia, chronic        * * * * Physician Interpretation * * * *    RESULT: EXAMINATION: CTA ABDOMEN AND PELVIS WITH IV CONTRAST    CLINICAL HISTORY: Mesenteric Ischemia, chronic  Mesenteric ischemia (with  or without stent in-situ)    TECHNIQUE: CTA of the abdomen and pelvis was performed using standard  technique, scanning from just above the dome of the diaphragm to the  symphysis pubis.  3-D image postprocessing was performed.  3-D  reformatted MIPS are submitted.  MQ:  CTCAPW_4    Contrast:  IV:  100 ml of Omnipaque 350    CT Radiation dose: Integrated Dose-length product (DLP) for this visit =    574 mGy*cm.  CT Dose Reduction Employed: Automated exposure control (AEC)    COMPARISON: 02/22/2024 MRI pancreas; 02/20/2024 and 07/15/2023 enhanced  A/P CT, 10/04/2023 chest x-ray, 07/16/2021 chest CT      RESULT:    Limitations:  None.     (topogram) images: No additional findings.    Aorta:    Abdominal aorta is normal in caliber and demonstrates no dissection,  aneurysmal dilatation, intramural hematoma, or hemodynamically  significant stenosis.  Moderate atherosclerotic plaque throughout the  abdominal aorta which narrows the takeoff of the celiac axis and SMA,  however these vessels and their branches are patent. TREVIN and single  bilateral renal artery takeoffs are patent.  Proximal left common femoral  artery again appears largely occluded (for example 2:400), dating back to  at least 07/15/2023.    Chest:      Abdomen / Pelvis:    Liver: No mass.    Biliary: CBD and intrahepatic biliary ductal dilatation again noted,  nonspecific but commonly seen in  post cholecystectomy status..    Spleen: No mass. No splenomegaly.    Pancreas: Ductal dilatation in the pancreatic body.  Pancreatic body mass  better evaluated on dedicated 02/22/2024 MRI pancreas/biliary.    Adrenals: No mass.    Kidneys: No hydronephrosis.  No renal mass.  Combination of stones and  renovascular calcifications bilaterally.    GI tract: No dilation or wall thickening. Diverticulum arises from a  right paramidline lower abdominal ileal loop (2:34; 602:71; 601, 11-12).    Lymph nodes: No abdominal or pelvic lymphadenopathy.    Mesentery/Peritoneum: No ascites or mass.    Retroperitoneum: No mass.    Pelvis: No fluid collection. Along the right lateral urinary bladder is  an enhancing 0.9 cm structure (2:345), perhaps a fold in the nondistended  urinary bladder wall however urinary bladder mass is not excluded.    Presacral edema/stranding is similar since 02/20/2024    Lower thorax: Incompletely-imaged, lobulated, 3.2 x 2.9 cm masslike  opacity perihilar left upper lobe (2:1).  Heart size is enlarged.    Multivessel coronary artery atherosclerotic calcifications.    Bones/Soft Tissues: Postsurgical changes left proximal femur.  Exam End: 02/23/24 18:08    Specimen Collected: 02/23/24 18:08 Last Resulted: 02/23/24 19:53   Received From: [facility]  Result Received: 03/12/24 07:44      Labs/Diagnostic Imaging    [facility]  Outside Information  Results  CT BRAIN WO IVCON (Order [id number])      suggestion  Information displayed in this report may not trend or trigger automated decision support.      CT BRAIN WO IVCON  Order: [id number]  Impression     IMPRESSION: Generalized atrophy and sequela of small vessel ischemic  disease.  No acute intracranial process is identified.        Transcribed Using Voice Recognition  Transcribe Date/Time: Apr 26 2023 12:04P    Dictated by: HORTENSIA CLEANING MD    This examination was interpreted and the report reviewed and  electronically signed by:  HORTENSIA  MD HERMILA on Apr 26 2023 12:07PM  EST  Narrative     * * *Final Report* * *    DATE OF EXAM: Apr 26 2023 11:51AM      [facility]   0504  -  CT BRAIN WO IVCON  / ACCESSION #  [id number]    PROCEDURE REASON: Mental status change, unknown cause        * * * * Physician Interpretation * * * *    RESULT: EXAMINATION: CT BRAIN WO IVCON    CLINICAL HISTORY: Altered mental status    TECHNIQUE:  Serial axial images without IV contrast were obtained from  the vertex to the foramen magnum.  MQ:  CTBWO_3    CT Radiation dose: Integrated Dose-Length Product (DLP) for this visit =    726  mGy*cm  CT Dose Reduction Employed: No dose reduction techniques were required    COMPARISON: 04/26/2022      RESULT: Please note that this examination is somewhat limited secondary  to patient positioning.    There is generalized atrophy.  Prominence of the ventricular system  secondary to atrophy.  Patchy and confluent hypodensity is seen within  the subcortical and periventricular white matter, likely the sequela of  small vessel ischemic disease.  Atherosclerotic calcifications are seen  within the carotid siphons, bilaterally.  Calcifications are seen within  the lentiform nuclei.  There is no acute intracranial hemorrhage.  There  is no extra-axial fluid collection or midline shift.  No effacement of  sulci or of gyri.  The orbits are intact.  Visualized paranasal sinuses  and mastoid air cells are grossly clear.       Exam End: 04/26/23 11:51     Specimen Collected: 04/26/23 11:51 Last Resulted: 04/26/23 12:10   Received From: [facility]  Result Received: 11/15/23 08:18         [facility]  Outside Information  Results      2/20/2024    CT abdomen pelvis w IV contrast (Order [id number])      Contains abnormal data CT abdomen pelvis w IV contrast  Order: [id number]  Impression    IMPRESSION:    No acute process in the abdomen or pelvis.    Prominence of the pancreatic duct to the pancreatic neck/body without a  focal lesion evident,  new since 4/25/2023. This could be further  evaluated with MRCP or EUS although the former will probably be limited  by motion artifact.    Right hepatic lobe lesion queried on the 7/15/2023 CT is not definitively  identified although evaluation is limited by motion artifact.    ACTIONABLE RESULT: FOLLOW-UP  Acuity: Actionable  Findings: Pancreas/Biliary Routing Code:  PB_1  Recommendation: Unlisted Recommendation (see report)  Time Frame: At the discretion of the clinical team.  COMMUNICATION: Results will be communicated with the ordering provider  via Epic staff message or phone message by Imaging Support Services  within 2 business days of report finalization.  --END OF FINDING--          Transcribed Using Voice Recognition  Transcribe Date/Time: Feb 20 2024  6:14P    Dictated by: CARMELINA MOON MD    This examination was interpreted and the report reviewed and  electronically signed by:  CARMELINA MOON MD on Feb 20 2024  6:40PM  EST  Narrative    * * *Final Report* * *    DATE OF EXAM: Feb 20 2024  6:03PM      [facility]   0530  -  CT ABD/PEL W IVCON  / ACCESSION #  [id number]    PROCEDURE REASON: Abdominal pain, acute, nonlocalized        * * * * Physician Interpretation * * * *    RESULT: EXAMINATION:  CT ABDOMEN AND PELVIS WITH IV CONTRAST    CLINICAL HISTORY: Abdominal pain    TECHNIQUE: CT of the abdomen and pelvis was performed using standard  technique, scanning from just above the dome of the diaphragm to the  symphysis pubis.  MQ:  CTAP_3    Contrast:  IV:  100 ml of Omnipaque 300    CT Radiation dose: Integrated Dose-length product (DLP) for this visit =  mGy*cm.  CT Dose Reduction Employed: Automated exposure control(AEC) and iterative  recon    COMPARISON: CT 07/15/2023, 04/25/2023      RESULT:    Images mildly degraded by respiratory motion.    Liver: The right hepatic lobe lesion on the prior study is not well  delineated given the motion artifact.    Biliary: No bile duct dilation.   Cholecystectomy.    Spleen: Stable subcentimeter low-attenuation lesion in the superior  aspect, too small to characterize but likely benign. No splenomegaly.    Pancreas: Prominence of the main duct to the level of the pancreatic  neck/body, new since 04/25/2023.  No focal mass evident.    Adrenals: No mass.    Kidneys: Vascular calcifications.  No mass or hydronephrosis.    GI tract: No dilation or wall thickening. Colonic diverticulosis without  diverticulitis.    Lymph nodes: No abdominal or pelvic lymphadenopathy.    Mesentery/Peritoneum: No ascites or mass.    Retroperitoneum: No mass.    Vasculature:   - Abdominal aorta and iliac arteries: Atherosclerotic calcifications  without aneurysm.   - Celiac and SMA: Atherosclerotic calcifications at the origins.   - Portal venous system (SMV, splenic vein, portal vein and branches):  Patent.   - Hepatic veins: Patent.    Pelvis: Decompressed urinary bladder with wall thickening and  perivesicular soft tissue infiltration, similar to 04/25/2023.    Bones/Soft Tissues: Partially visualized left proximal femoral reymundo and  screw.    Lower thorax: No focal consolidation or effusion.     (topogram) images: No additional findings.  All Measurements     Exam End: 02/20/24 18:03    Specimen Collected: 02/20/24 18:03 Last Resulted: 02/20/24 18:42   Received From: [facility]  Result Received: 03/12/24 07:44        Chemistry    Lab Results   Component Value Date/Time     01/23/2024 1217    K 4.6 01/23/2024 1217     (H) 01/23/2024 1217    CO2 27 01/23/2024 1217    BUN 25 (H) 01/23/2024 1217    CREATININE 1.02 01/23/2024 1217    Lab Results   Component Value Date/Time    CALCIUM 8.6 01/23/2024 1217    ALKPHOS 37 01/23/2024 1217    AST 6 (L) 01/23/2024 1217    ALT 6 (L) 01/23/2024 1217    BILITOT 0.6 01/23/2024 1217        Lab Results   Component Value Date    WBC 7.8 01/23/2024    HGB 9.6 (L) 01/23/2024    HCT 30.1 (L) 01/23/2024     01/23/2024    PLT  "196 01/23/2024     Lab Results   Component Value Date    HGBA1C 5.3 01/23/2024     ASSESSMENT/PLAN             1. Suspect Urothelial carcinoma & Malignant neoplasm of urinary bladder, unspecified site /Suspected lung cancer  - refer to Hospice  -DNC-needs form sign       2.Pain of upper abdomen  -intractable  -Tramadol and Tylenol ineffective  -At the time of visit, NP contacted collaborating MD , Dr. Janet Gaona.  -She will enter script for Oxycodone IR 5 mg tid for moderate to severe pain  -Teaching/education done with Li to avoid constipation- she verbalizes a clear understanding   -Will discontinue Tramadol and Methocarbamol.     3. Advanced care planning/counseling discussion      ++++++ADVANCE CARE PLANNING CONVERSATION ++++++    Pertinent Diagnoses: Intractable Abdominal Pain secondary to SUSPECT LUNG AND BLADDER MALIGNANCY,Y, CAD, Major Neurocognitive Disorder.     The patient and/or family consented to a voluntary Advanced Care Planning conversation- Li -daughter and primary caregiver      Individuals present for the conversation: Li    Summary of the conversation: Li states she is now ready to refer patient to Hospice. Became tearful. \"I just don't want her to suffer\". States no more going back to the hospital because the family do not desire to pursue any further workup.     Outcome of the conversation and documents completed (select all that apply): Decision to change code status to DNR-comfort care  and Decision to focus on comfort care and to make a referral to hospice     I spent 17 minutes providing separately identifiable ACP services with the patient and/or surrogate decision maker in a voluntary, in-person conversation discussing the patient's wishes and goals as detailed in the above note.     A [facility] will be made to [facility] (needs form signed)   Li has would like for this NP to remain as PCP- agreed     4. Urticaria  -Start " hydroxyzine 10 mg tid prn   -discussed can cause drowsiness    -Stable, but in pain  -Refer to Hospice  -Follow up in one month

## 2024-03-13 ENCOUNTER — TELEPHONE (OUTPATIENT)
Dept: POST ACUTE CARE | Facility: EXTERNAL LOCATION | Age: 89
End: 2024-03-13
Payer: MEDICARE

## 2024-03-13 VITALS
SYSTOLIC BLOOD PRESSURE: 142 MMHG | TEMPERATURE: 98.5 F | RESPIRATION RATE: 22 BRPM | DIASTOLIC BLOOD PRESSURE: 60 MMHG | HEART RATE: 64 BPM

## 2024-03-13 DIAGNOSIS — M79.605 PAIN IN LEFT LEG: Primary | ICD-10-CM

## 2024-03-13 DIAGNOSIS — R10.9 ABDOMINAL PAIN, UNSPECIFIED ABDOMINAL LOCATION: ICD-10-CM

## 2024-03-13 DIAGNOSIS — L89.302 PRESSURE INJURY OF BUTTOCK, STAGE 2, UNSPECIFIED LATERALITY (MULTI): ICD-10-CM

## 2024-03-13 PROBLEM — R68.89 SUSPECTED LUNG CANCER: Status: ACTIVE | Noted: 2024-03-13

## 2024-03-13 PROBLEM — C68.9 UROTHELIAL CARCINOMA (MULTI): Status: ACTIVE | Noted: 2024-03-13

## 2024-03-13 PROBLEM — Z71.89 ADVANCED CARE PLANNING/COUNSELING DISCUSSION: Status: ACTIVE | Noted: 2024-03-13

## 2024-03-13 PROBLEM — L50.9 URTICARIA: Status: ACTIVE | Noted: 2024-03-13

## 2024-03-13 PROBLEM — M17.12 LOCALIZED OSTEOARTHRITIS OF LEFT KNEE: Status: ACTIVE | Noted: 2023-10-03

## 2024-03-13 RX ORDER — HYDROXYZINE HYDROCHLORIDE 10 MG/1
10 TABLET, FILM COATED ORAL 3 TIMES DAILY PRN
COMMUNITY
End: 2024-03-13 | Stop reason: SDUPTHER

## 2024-03-13 RX ORDER — OXYCODONE HYDROCHLORIDE 5 MG/1
5 TABLET ORAL EVERY 8 HOURS PRN
Qty: 60 TABLET | Refills: 0 | Status: SHIPPED | OUTPATIENT
Start: 2024-03-13

## 2024-03-13 RX ORDER — HYDROXYZINE HYDROCHLORIDE 10 MG/1
10 TABLET, FILM COATED ORAL 3 TIMES DAILY PRN
Qty: 90 TABLET | Refills: 3 | Status: SHIPPED | OUTPATIENT
Start: 2024-03-13 | End: 2025-03-13

## 2024-03-13 RX ORDER — NALOXONE HYDROCHLORIDE 4 MG/.1ML
4 SPRAY NASAL AS NEEDED
Qty: 1 EACH | Refills: 3 | Status: SHIPPED | OUTPATIENT
Start: 2024-03-13 | End: 2025-03-13

## 2024-03-13 NOTE — TELEPHONE ENCOUNTER
Sent script for oxycodone to the pharmacy. Reviewed [facility]. Sent note to pharmacy to discontinue tramadol

## 2024-03-13 NOTE — PATIENT INSTRUCTIONS
Dear Mrs. Edwards,  I am sorry that you were not feeling well today.    I have referred you to [facility].    They will contact you in a day or so.    I have requested a change in you pain medication and ordered Hydroxyzine for itching.    I will follow up with you in one month or sooner if needed.    Sincerely,    Karolina Martínez MSN, APRN-BC

## 2024-03-14 ENCOUNTER — TELEPHONE (OUTPATIENT)
Dept: GERIATRIC MEDICINE | Facility: CLINIC | Age: 89
End: 2024-03-14